# Patient Record
Sex: FEMALE | Race: WHITE | NOT HISPANIC OR LATINO | URBAN - METROPOLITAN AREA
[De-identification: names, ages, dates, MRNs, and addresses within clinical notes are randomized per-mention and may not be internally consistent; named-entity substitution may affect disease eponyms.]

---

## 2018-11-30 PROBLEM — Z00.00 ENCOUNTER FOR PREVENTIVE HEALTH EXAMINATION: Noted: 2018-11-30

## 2019-05-17 ENCOUNTER — INPATIENT (INPATIENT)
Facility: HOSPITAL | Age: 19
LOS: 1 days | Discharge: HOME | End: 2019-05-19
Attending: OBSTETRICS & GYNECOLOGY | Admitting: OBSTETRICS & GYNECOLOGY
Payer: MEDICAID

## 2019-05-17 VITALS — HEART RATE: 90 BPM | SYSTOLIC BLOOD PRESSURE: 122 MMHG | DIASTOLIC BLOOD PRESSURE: 73 MMHG

## 2019-05-17 LAB
ABO RH CONFIRMATION: SIGNIFICANT CHANGE UP
AMPHET UR-MCNC: NEGATIVE — SIGNIFICANT CHANGE UP
APPEARANCE UR: ABNORMAL
BACTERIA # UR AUTO: ABNORMAL /HPF
BARBITURATES UR SCN-MCNC: NEGATIVE — SIGNIFICANT CHANGE UP
BASOPHILS # BLD AUTO: 0.03 K/UL — SIGNIFICANT CHANGE UP (ref 0–0.2)
BASOPHILS NFR BLD AUTO: 0.2 % — SIGNIFICANT CHANGE UP (ref 0–1)
BENZODIAZ UR-MCNC: NEGATIVE — SIGNIFICANT CHANGE UP
BILIRUB UR-MCNC: NEGATIVE — SIGNIFICANT CHANGE UP
BLD GP AB SCN SERPL QL: SIGNIFICANT CHANGE UP
BUPRENORPHINE SCREEN, URINE RESULT: NEGATIVE — SIGNIFICANT CHANGE UP
COCAINE METAB.OTHER UR-MCNC: NEGATIVE — SIGNIFICANT CHANGE UP
COLOR SPEC: YELLOW — SIGNIFICANT CHANGE UP
DIFF PNL FLD: ABNORMAL
EOSINOPHIL # BLD AUTO: 0.02 K/UL — SIGNIFICANT CHANGE UP (ref 0–0.7)
EOSINOPHIL NFR BLD AUTO: 0.1 % — SIGNIFICANT CHANGE UP (ref 0–8)
EPI CELLS # UR: ABNORMAL /HPF
GLUCOSE UR QL: NEGATIVE MG/DL — SIGNIFICANT CHANGE UP
HCT VFR BLD CALC: 41.2 % — SIGNIFICANT CHANGE UP (ref 37–47)
HGB BLD-MCNC: 14.6 G/DL — SIGNIFICANT CHANGE UP (ref 12–16)
IMM GRANULOCYTES NFR BLD AUTO: 0.5 % — HIGH (ref 0.1–0.3)
KETONES UR-MCNC: NEGATIVE — SIGNIFICANT CHANGE UP
L&D DRUG SCREEN, URINE: SIGNIFICANT CHANGE UP
LEUKOCYTE ESTERASE UR-ACNC: ABNORMAL
LYMPHOCYTES # BLD AUTO: 1.12 K/UL — LOW (ref 1.2–3.4)
LYMPHOCYTES # BLD AUTO: 7.6 % — LOW (ref 20.5–51.1)
MCHC RBC-ENTMCNC: 30.2 PG — SIGNIFICANT CHANGE UP (ref 27–31)
MCHC RBC-ENTMCNC: 35.4 G/DL — SIGNIFICANT CHANGE UP (ref 32–37)
MCV RBC AUTO: 85.1 FL — SIGNIFICANT CHANGE UP (ref 81–99)
METHADONE UR-MCNC: NEGATIVE — SIGNIFICANT CHANGE UP
MONOCYTES # BLD AUTO: 0.61 K/UL — HIGH (ref 0.1–0.6)
MONOCYTES NFR BLD AUTO: 4.2 % — SIGNIFICANT CHANGE UP (ref 1.7–9.3)
NEUTROPHILS # BLD AUTO: 12.83 K/UL — HIGH (ref 1.4–6.5)
NEUTROPHILS NFR BLD AUTO: 87.4 % — HIGH (ref 42.2–75.2)
NITRITE UR-MCNC: NEGATIVE — SIGNIFICANT CHANGE UP
NRBC # BLD: 0 /100 WBCS — SIGNIFICANT CHANGE UP (ref 0–0)
OPIATES UR-MCNC: NEGATIVE — SIGNIFICANT CHANGE UP
OXYCODONE UR-MCNC: NEGATIVE — SIGNIFICANT CHANGE UP
PCP UR-MCNC: NEGATIVE — SIGNIFICANT CHANGE UP
PH UR: 7 — SIGNIFICANT CHANGE UP (ref 5–8)
PLATELET # BLD AUTO: 202 K/UL — SIGNIFICANT CHANGE UP (ref 130–400)
PRENATAL SYPHILIS TEST: SIGNIFICANT CHANGE UP
PROPOXYPHENE QUALITATIVE URINE RESULT: NEGATIVE — SIGNIFICANT CHANGE UP
PROT UR-MCNC: NEGATIVE MG/DL — SIGNIFICANT CHANGE UP
RBC # BLD: 4.84 M/UL — SIGNIFICANT CHANGE UP (ref 4.2–5.4)
RBC # FLD: 13.2 % — SIGNIFICANT CHANGE UP (ref 11.5–14.5)
RBC CASTS # UR COMP ASSIST: ABNORMAL /HPF
SP GR SPEC: 1.02 — SIGNIFICANT CHANGE UP (ref 1.01–1.03)
TYPE + AB SCN PNL BLD: SIGNIFICANT CHANGE UP
UROBILINOGEN FLD QL: 0.2 MG/DL — SIGNIFICANT CHANGE UP (ref 0.2–0.2)
WBC # BLD: 14.69 K/UL — HIGH (ref 4.8–10.8)
WBC # FLD AUTO: 14.69 K/UL — HIGH (ref 4.8–10.8)
WBC UR QL: SIGNIFICANT CHANGE UP /HPF

## 2019-05-17 PROCEDURE — 59400 OBSTETRICAL CARE: CPT | Mod: U9

## 2019-05-17 RX ORDER — NALOXONE HYDROCHLORIDE 4 MG/.1ML
0.1 SPRAY NASAL
Refills: 0 | Status: DISCONTINUED | OUTPATIENT
Start: 2019-05-17 | End: 2019-05-19

## 2019-05-17 RX ORDER — IBUPROFEN 200 MG
600 TABLET ORAL EVERY 6 HOURS
Refills: 0 | Status: DISCONTINUED | OUTPATIENT
Start: 2019-05-17 | End: 2019-05-19

## 2019-05-17 RX ORDER — DEXAMETHASONE 0.5 MG/5ML
4 ELIXIR ORAL EVERY 6 HOURS
Refills: 0 | Status: DISCONTINUED | OUTPATIENT
Start: 2019-05-17 | End: 2019-05-19

## 2019-05-17 RX ORDER — SODIUM CHLORIDE 9 MG/ML
1000 INJECTION, SOLUTION INTRAVENOUS
Refills: 0 | Status: DISCONTINUED | OUTPATIENT
Start: 2019-05-17 | End: 2019-05-17

## 2019-05-17 RX ORDER — BENZOCAINE 10 %
1 GEL (GRAM) MUCOUS MEMBRANE EVERY 6 HOURS
Refills: 0 | Status: DISCONTINUED | OUTPATIENT
Start: 2019-05-17 | End: 2019-05-19

## 2019-05-17 RX ORDER — DIPHENHYDRAMINE HCL 50 MG
25 CAPSULE ORAL EVERY 6 HOURS
Refills: 0 | Status: DISCONTINUED | OUTPATIENT
Start: 2019-05-17 | End: 2019-05-19

## 2019-05-17 RX ORDER — IBUPROFEN 200 MG
600 TABLET ORAL EVERY 6 HOURS
Refills: 0 | Status: COMPLETED | OUTPATIENT
Start: 2019-05-17 | End: 2020-04-14

## 2019-05-17 RX ORDER — OXYTOCIN 10 UNIT/ML
41.67 VIAL (ML) INJECTION
Qty: 20 | Refills: 0 | Status: DISCONTINUED | OUTPATIENT
Start: 2019-05-17 | End: 2019-05-19

## 2019-05-17 RX ORDER — SODIUM CHLORIDE 9 MG/ML
3 INJECTION INTRAMUSCULAR; INTRAVENOUS; SUBCUTANEOUS EVERY 8 HOURS
Refills: 0 | Status: DISCONTINUED | OUTPATIENT
Start: 2019-05-17 | End: 2019-05-18

## 2019-05-17 RX ORDER — OXYCODONE HYDROCHLORIDE 5 MG/1
5 TABLET ORAL ONCE
Refills: 0 | Status: DISCONTINUED | OUTPATIENT
Start: 2019-05-17 | End: 2019-05-19

## 2019-05-17 RX ORDER — OXYTOCIN 10 UNIT/ML
2 VIAL (ML) INJECTION
Qty: 30 | Refills: 0 | Status: DISCONTINUED | OUTPATIENT
Start: 2019-05-17 | End: 2019-05-17

## 2019-05-17 RX ORDER — ONDANSETRON 8 MG/1
4 TABLET, FILM COATED ORAL EVERY 6 HOURS
Refills: 0 | Status: DISCONTINUED | OUTPATIENT
Start: 2019-05-17 | End: 2019-05-19

## 2019-05-17 RX ORDER — OXYTOCIN 10 UNIT/ML
333.33 VIAL (ML) INJECTION
Qty: 20 | Refills: 0 | Status: DISCONTINUED | OUTPATIENT
Start: 2019-05-17 | End: 2019-05-19

## 2019-05-17 RX ORDER — OXYCODONE HYDROCHLORIDE 5 MG/1
5 TABLET ORAL
Refills: 0 | Status: DISCONTINUED | OUTPATIENT
Start: 2019-05-17 | End: 2019-05-19

## 2019-05-17 RX ORDER — GLYCERIN ADULT
1 SUPPOSITORY, RECTAL RECTAL AT BEDTIME
Refills: 0 | Status: DISCONTINUED | OUTPATIENT
Start: 2019-05-17 | End: 2019-05-19

## 2019-05-17 RX ORDER — DIBUCAINE 1 %
1 OINTMENT (GRAM) RECTAL EVERY 6 HOURS
Refills: 0 | Status: DISCONTINUED | OUTPATIENT
Start: 2019-05-17 | End: 2019-05-19

## 2019-05-17 RX ORDER — DOCUSATE SODIUM 100 MG
100 CAPSULE ORAL
Refills: 0 | Status: DISCONTINUED | OUTPATIENT
Start: 2019-05-17 | End: 2019-05-19

## 2019-05-17 RX ORDER — SIMETHICONE 80 MG/1
80 TABLET, CHEWABLE ORAL EVERY 4 HOURS
Refills: 0 | Status: DISCONTINUED | OUTPATIENT
Start: 2019-05-17 | End: 2019-05-19

## 2019-05-17 RX ORDER — KETOROLAC TROMETHAMINE 30 MG/ML
30 SYRINGE (ML) INJECTION ONCE
Refills: 0 | Status: DISCONTINUED | OUTPATIENT
Start: 2019-05-17 | End: 2019-05-17

## 2019-05-17 RX ORDER — HYDROCORTISONE 1 %
1 OINTMENT (GRAM) TOPICAL EVERY 6 HOURS
Refills: 0 | Status: DISCONTINUED | OUTPATIENT
Start: 2019-05-17 | End: 2019-05-19

## 2019-05-17 RX ORDER — AER TRAVELER 0.5 G/1
1 SOLUTION RECTAL; TOPICAL EVERY 4 HOURS
Refills: 0 | Status: DISCONTINUED | OUTPATIENT
Start: 2019-05-17 | End: 2019-05-19

## 2019-05-17 RX ORDER — LANOLIN
1 OINTMENT (GRAM) TOPICAL EVERY 6 HOURS
Refills: 0 | Status: DISCONTINUED | OUTPATIENT
Start: 2019-05-17 | End: 2019-05-19

## 2019-05-17 RX ORDER — ACETAMINOPHEN 500 MG
975 TABLET ORAL EVERY 6 HOURS
Refills: 0 | Status: DISCONTINUED | OUTPATIENT
Start: 2019-05-17 | End: 2019-05-19

## 2019-05-17 RX ORDER — BUPIVACAINE HCL/PF 7.5 MG/ML
250 VIAL (ML) INJECTION
Refills: 0 | Status: DISCONTINUED | OUTPATIENT
Start: 2019-05-17 | End: 2019-05-19

## 2019-05-17 RX ORDER — MAGNESIUM HYDROXIDE 400 MG/1
30 TABLET, CHEWABLE ORAL
Refills: 0 | Status: DISCONTINUED | OUTPATIENT
Start: 2019-05-17 | End: 2019-05-19

## 2019-05-17 RX ADMIN — Medication 975 MILLIGRAM(S): at 21:31

## 2019-05-17 RX ADMIN — SODIUM CHLORIDE 3 MILLILITER(S): 9 INJECTION INTRAMUSCULAR; INTRAVENOUS; SUBCUTANEOUS at 21:12

## 2019-05-17 RX ADMIN — Medication 30 MILLIGRAM(S): at 15:19

## 2019-05-17 RX ADMIN — Medication 600 MILLIGRAM(S): at 17:49

## 2019-05-17 RX ADMIN — Medication 2 MILLIUNIT(S)/MIN: at 10:57

## 2019-05-17 NOTE — PROGRESS NOTE ADULT - SUBJECTIVE AND OBJECTIVE BOX
PGY1 Note    Patient seen at bedside for evaluation for a prolonged deceleration lasting 8 min with benedict of 60pbm, recovered to baseline with resuscitative measures including pitocin off, IV fluid bolus, repositioning and oxygen mask. Patient has no complaints    T(F): 98.24 ( @ 10:27), Max: 98.7 ( @ 06:29)  HR: 71 ( @ 11:09)  BP: 97/53 ( @ 11:09) (93/50 - 130/75)  RR: 18 ( @ 06:29)  EFM: 150/mod/+accels  TOCO: q 1-5min  SVE: 7/90/-1    Medications:    oxytocin Infusion: 2 ( @ 10:43) Pitocin off      Labs:                        14.6   14.69 )-----------( 202      ( 17 May 2019 07:31 )             41.2           Prenatal Syphilis Test: Nonreact ( @ 07:31)  Type + Screen: B POS (19 @ 07:31)  Antibody Screen: NEG (19 @ 07:31)    Urinalysis Basic - ( 17 May 2019 07:31 )    Color: Yellow / Appearance: Cloudy / S.025 / pH: x  Gluc: x / Ketone: Negative  / Bili: Negative / Urobili: 0.2 mg/dL   Blood: x / Protein: Negative mg/dL / Nitrite: Negative   Leuk Esterase: Trace / RBC: 26-50 /HPF / WBC 3-5 /HPF   Sq Epi: x / Non Sq Epi: Moderate /HPF / Bacteria: Moderate /HPF    UDS pending

## 2019-05-17 NOTE — PROCEDURE NOTE - NSCATHREMOVE_OBGYN_ALL_OB
I reviewed the H&P, I examined the patient, and there are no changes in the patient's condition.     No, to be performed by RN per instructions

## 2019-05-17 NOTE — PROGRESS NOTE ADULT - ASSESSMENT
18yo  at 40.6w in labor, GBS negative, with 8min deceleration down to 60bpm, recovered to baseline, pitocin off    -Cont efm and toco  -IV hydration and clear liquid diet  -Pain management prn  -Restart pitocin once recovered    Dr. Harding and Dr. Heard aware

## 2019-05-17 NOTE — OB RN PATIENT PROFILE - NO SIGNIFICANT PAST SURGICAL HISTORY
We are STOPPING the amlodipine (Norvasc) because you no longer need it for your blood pressure. My hope is that your energy will improve a little when your blood pressure comes up a little bit. Please schedule a visit with Dr. Nehal Jackson for your heart. Use your Spiriva and Advair EVERY DAY to PREVENT breathing symptoms. When you have symptoms, use the albuterol for relief. <<----- Click to add NO significant Past Surgical History

## 2019-05-17 NOTE — OB PROVIDER H&P - ATTENDING COMMENTS
18 y/o p0 at 40.6 wks w/c/o irreg ctx, +fm, no rom, no bleeding.    pnc: uncomplicated  gbs neg  abd: f=4824 g, nt  ext: no edema  cx: 5/90/-1/ruptured clear on exam  nst: reactive  toco: irreg ctx  admit, analgesia, pitocin prn, r/b/a rev, ques answered, anticipate nsd

## 2019-05-17 NOTE — OB PROVIDER H&P - NSHPPHYSICALEXAM_GEN_ALL_CORE
PHYSICAL EXAM:  T(F): 98.7 (05-17 @ 06:29)  HR: 90 (05-17 @ 06:29)  BP: 122/73 (05-17 @ 06:29)  RR: 18 (05-17 @ 06:29)  Constitutional: AAOx3, NAD  Abdomen: Soft, gravid, nontender, no palpable ctx    TOCO Q 4  VE 5/90/-1  no membranes palpable PHYSICAL EXAM:  T(F): 98.7 (05-17 @ 06:29)  HR: 90 (05-17 @ 06:29)  BP: 122/73 (05-17 @ 06:29)  RR: 18 (05-17 @ 06:29)  Constitutional: AAOx3, NAD  Abdomen: Soft, gravid, nontender, no palpable ctx   CAT I  TOCO Q 4  VE 5/90/-1  no membranes palpable

## 2019-05-17 NOTE — OB PROVIDER H&P - ASSESSMENT
19y @  40.6 weeks in active labor  Admit to Labor & delivery  IV hydration  admission labs  Clear fluids  EFM & TOCO monitoring  analgesia prn  Dr Heard aware

## 2019-05-17 NOTE — OB PROVIDER H&P - HISTORY OF PRESENT ILLNESS
The pt is a 19y y/o   @ 40.6  weeks  edc  19 dated by lmp who presents to labor & delivery c/o  contractions, bloody denisse, with some leaking not sure what time  + fetal movement

## 2019-05-17 NOTE — OB PROVIDER DELIVERY SUMMARY - NSPROVIDERDELIVERYNOTE_OBGYN_ALL_OB_FT
Patient fully dilated, OA, pushed to deliver viable male .  Apgar 9/9.  Placenta intact with 3vc. Cervix, vagina intact.  Median episiotomy repaired with 2-0/3-0 chromic.   cc.  Tolerated well.  Infant to wbn.

## 2019-05-18 LAB
BASOPHILS # BLD AUTO: 0.03 K/UL — SIGNIFICANT CHANGE UP (ref 0–0.2)
BASOPHILS NFR BLD AUTO: 0.2 % — SIGNIFICANT CHANGE UP (ref 0–1)
EOSINOPHIL # BLD AUTO: 0.07 K/UL — SIGNIFICANT CHANGE UP (ref 0–0.7)
EOSINOPHIL NFR BLD AUTO: 0.5 % — SIGNIFICANT CHANGE UP (ref 0–8)
HCT VFR BLD CALC: 35.1 % — LOW (ref 37–47)
HGB BLD-MCNC: 12 G/DL — SIGNIFICANT CHANGE UP (ref 12–16)
IMM GRANULOCYTES NFR BLD AUTO: 0.5 % — HIGH (ref 0.1–0.3)
LYMPHOCYTES # BLD AUTO: 1.89 K/UL — SIGNIFICANT CHANGE UP (ref 1.2–3.4)
LYMPHOCYTES # BLD AUTO: 14.8 % — LOW (ref 20.5–51.1)
MCHC RBC-ENTMCNC: 30.1 PG — SIGNIFICANT CHANGE UP (ref 27–31)
MCHC RBC-ENTMCNC: 34.2 G/DL — SIGNIFICANT CHANGE UP (ref 32–37)
MCV RBC AUTO: 88 FL — SIGNIFICANT CHANGE UP (ref 81–99)
MONOCYTES # BLD AUTO: 1.01 K/UL — HIGH (ref 0.1–0.6)
MONOCYTES NFR BLD AUTO: 7.9 % — SIGNIFICANT CHANGE UP (ref 1.7–9.3)
NEUTROPHILS # BLD AUTO: 9.69 K/UL — HIGH (ref 1.4–6.5)
NEUTROPHILS NFR BLD AUTO: 76.1 % — HIGH (ref 42.2–75.2)
NRBC # BLD: 0 /100 WBCS — SIGNIFICANT CHANGE UP (ref 0–0)
PLATELET # BLD AUTO: 144 K/UL — SIGNIFICANT CHANGE UP (ref 130–400)
RBC # BLD: 3.99 M/UL — LOW (ref 4.2–5.4)
RBC # FLD: 13.7 % — SIGNIFICANT CHANGE UP (ref 11.5–14.5)
WBC # BLD: 12.75 K/UL — HIGH (ref 4.8–10.8)
WBC # FLD AUTO: 12.75 K/UL — HIGH (ref 4.8–10.8)

## 2019-05-18 RX ADMIN — SODIUM CHLORIDE 3 MILLILITER(S): 9 INJECTION INTRAMUSCULAR; INTRAVENOUS; SUBCUTANEOUS at 15:39

## 2019-05-18 RX ADMIN — Medication 975 MILLIGRAM(S): at 09:23

## 2019-05-18 RX ADMIN — Medication 600 MILLIGRAM(S): at 17:46

## 2019-05-18 RX ADMIN — Medication 600 MILLIGRAM(S): at 12:07

## 2019-05-18 RX ADMIN — Medication 600 MILLIGRAM(S): at 00:34

## 2019-05-18 RX ADMIN — Medication 1 TABLET(S): at 12:07

## 2019-05-18 RX ADMIN — SODIUM CHLORIDE 3 MILLILITER(S): 9 INJECTION INTRAMUSCULAR; INTRAVENOUS; SUBCUTANEOUS at 05:50

## 2019-05-18 RX ADMIN — Medication 975 MILLIGRAM(S): at 04:20

## 2019-05-18 RX ADMIN — Medication 100 MILLIGRAM(S): at 23:34

## 2019-05-18 RX ADMIN — Medication 600 MILLIGRAM(S): at 23:34

## 2019-05-18 RX ADMIN — Medication 600 MILLIGRAM(S): at 05:48

## 2019-05-18 NOTE — PROGRESS NOTE ADULT - SUBJECTIVE AND OBJECTIVE BOX
OB attending  PPD #1    Pt doing well, pain well controlled. No overnight events, no acute complaints.    Ambulating: Yes  Voiding: Yes  Flatus: Yes  Bowel movements: Yes   Breast or bottle feeding: Breastfeeding  Diet: Regular    PAST MEDICAL & SURGICAL HISTORY:  No pertinent past medical history  No significant past surgical history      Physical Exam  Vital Signs Last 24 Hrs  T(C): 36.4 (17 May 2019 23:44), Max: 36.8 (17 May 2019 10:27)  T(F): 97.6 (17 May 2019 23:44), Max: 98.24 (17 May 2019 10:27)  HR: 80 (17 May 2019 23:44) (70 - 103)  BP: 118/56 (17 May 2019 23:44) (93/50 - 130/75)  BP(mean): --  RR: 18 (17 May 2019 23:44) (18 - 18)  SpO2: --  Gen: AAOx3, NAD  Abd: Soft, nontender, nondistended, BS+  Fundus: Firm, below umbilicus  Lochia: normal  Ext: No calf tenderness, no swelling    Labs:                        12.0   12.75 )-----------( 144      ( 18 May 2019 04:30 )             35.1         A/P:20 yo , s/p , PPD #1, doing well  - continue current management

## 2019-05-19 ENCOUNTER — TRANSCRIPTION ENCOUNTER (OUTPATIENT)
Age: 19
End: 2019-05-19

## 2019-05-19 VITALS
DIASTOLIC BLOOD PRESSURE: 57 MMHG | HEART RATE: 71 BPM | RESPIRATION RATE: 18 BRPM | SYSTOLIC BLOOD PRESSURE: 110 MMHG | TEMPERATURE: 96 F

## 2019-05-19 RX ORDER — ACETAMINOPHEN 500 MG
3 TABLET ORAL
Qty: 0 | Refills: 0 | DISCHARGE
Start: 2019-05-19

## 2019-05-19 RX ADMIN — Medication 600 MILLIGRAM(S): at 06:16

## 2019-05-19 RX ADMIN — Medication 600 MILLIGRAM(S): at 11:32

## 2019-05-19 NOTE — DISCHARGE NOTE OB - CARE PLAN
no flank pain L/no dysuria/normal urinary frequency/no hematuria/no renal colic/no flank pain R/no incontinence/no urinary hesitancy
Principal Discharge DX:	Vaginal delivery  Goal:	healthy recovery  Assessment and plan of treatment:	Nothing per vagina x 6 weeks; no sex/tampons/swimming/baths. Showers are OKAY.

## 2019-05-19 NOTE — DISCHARGE NOTE OB - HOSPITAL COURSE
DATE OF DISCHARGE: 19 @ 08:27    HISTORY OF PRESENT ILLNESS/HOSPITAL COURSE: HPI:  The pt is a 19y y/o   @ 40.6  weeks  edc  19 dated by lmp who presents to labor & delivery c/o  contractions, bloody denisse, with some leaking not sure what time  + fetal movement (17 May 2019 07:31)    PAST MEDICAL & SURGICAL HISTORY:  No pertinent past medical history  No significant past surgical history      PROCEDURES PERFORMED: vaginal delivery    COMPLICATIONS:  none    POST PARTUM COURSE: uncomplicated     FINAL DIAGNOSIS:  normal vaginal delivery    LABS:                       12.0   12.75 )-----------( 144      ( 18 May 2019 04:30 )             35.1

## 2019-05-19 NOTE — DISCHARGE NOTE OB - MEDICATION SUMMARY - MEDICATIONS TO STOP TAKING
I will STOP taking the medications listed below when I get home from the hospital:    acetaminophen 325 mg oral tablet  -- 3 tab(s) by mouth every 6 hours

## 2019-05-19 NOTE — DISCHARGE NOTE OB - CARE PROVIDER_API CALL
Claus Capone)  Obstetrics and Gynecology  [address] [place] [address]  Phone: [phone]  Fax: [phone]  Follow Up Time:

## 2019-05-19 NOTE — DISCHARGE NOTE OB - PATIENT PORTAL LINK FT
You can access the [facility] Patient Portal, offered by [facility], by registering with the following website: [email or web address]

## 2019-05-21 ENCOUNTER — INPATIENT (INPATIENT)
Facility: HOSPITAL | Age: 19
LOS: 1 days | Discharge: HOME | End: 2019-05-23
Attending: OBSTETRICS & GYNECOLOGY | Admitting: OBSTETRICS & GYNECOLOGY
Payer: MEDICAID

## 2019-05-21 VITALS — TEMPERATURE: 98 F

## 2019-05-21 LAB
ALBUMIN SERPL ELPH-MCNC: 3.6 G/DL — SIGNIFICANT CHANGE UP (ref 3.5–5.2)
ALP SERPL-CCNC: 141 U/L — HIGH (ref 30–115)
ALT FLD-CCNC: 93 U/L — HIGH (ref 0–41)
ANION GAP SERPL CALC-SCNC: 13 MMOL/L — SIGNIFICANT CHANGE UP (ref 7–14)
APPEARANCE UR: CLEAR — SIGNIFICANT CHANGE UP
AST SERPL-CCNC: 51 U/L — HIGH (ref 0–41)
BACTERIA # UR AUTO: ABNORMAL /HPF
BILIRUB SERPL-MCNC: <0.2 MG/DL — SIGNIFICANT CHANGE UP (ref 0.2–1.2)
BILIRUB UR-MCNC: NEGATIVE — SIGNIFICANT CHANGE UP
BUN SERPL-MCNC: 12 MG/DL — SIGNIFICANT CHANGE UP (ref 10–20)
CALCIUM SERPL-MCNC: 8.7 MG/DL — SIGNIFICANT CHANGE UP (ref 8.5–10.1)
CHLORIDE SERPL-SCNC: 109 MMOL/L — SIGNIFICANT CHANGE UP (ref 98–110)
CO2 SERPL-SCNC: 21 MMOL/L — SIGNIFICANT CHANGE UP (ref 17–32)
COLOR SPEC: YELLOW — SIGNIFICANT CHANGE UP
CREAT SERPL-MCNC: 0.5 MG/DL — LOW (ref 0.7–1.5)
DIFF PNL FLD: ABNORMAL
EPI CELLS # UR: ABNORMAL /HPF
GLUCOSE SERPL-MCNC: 82 MG/DL — SIGNIFICANT CHANGE UP (ref 70–99)
GLUCOSE UR QL: NEGATIVE MG/DL — SIGNIFICANT CHANGE UP
HCT VFR BLD CALC: 41.3 % — SIGNIFICANT CHANGE UP (ref 37–47)
HGB BLD-MCNC: 14.1 G/DL — SIGNIFICANT CHANGE UP (ref 12–16)
KETONES UR-MCNC: NEGATIVE — SIGNIFICANT CHANGE UP
LDH SERPL L TO P-CCNC: 341 — HIGH (ref 50–242)
LEUKOCYTE ESTERASE UR-ACNC: ABNORMAL
MCHC RBC-ENTMCNC: 29.8 PG — SIGNIFICANT CHANGE UP (ref 27–31)
MCHC RBC-ENTMCNC: 34.1 G/DL — SIGNIFICANT CHANGE UP (ref 32–37)
MCV RBC AUTO: 87.3 FL — SIGNIFICANT CHANGE UP (ref 81–99)
NITRITE UR-MCNC: NEGATIVE — SIGNIFICANT CHANGE UP
NRBC # BLD: 0 /100 WBCS — SIGNIFICANT CHANGE UP (ref 0–0)
PH UR: 6.5 — SIGNIFICANT CHANGE UP (ref 5–8)
PLATELET # BLD AUTO: 218 K/UL — SIGNIFICANT CHANGE UP (ref 130–400)
POTASSIUM SERPL-MCNC: 4.1 MMOL/L — SIGNIFICANT CHANGE UP (ref 3.5–5)
POTASSIUM SERPL-SCNC: 4.1 MMOL/L — SIGNIFICANT CHANGE UP (ref 3.5–5)
PROT SERPL-MCNC: 6 G/DL — SIGNIFICANT CHANGE UP (ref 6–8)
PROT UR-MCNC: NEGATIVE MG/DL — SIGNIFICANT CHANGE UP
RBC # BLD: 4.73 M/UL — SIGNIFICANT CHANGE UP (ref 4.2–5.4)
RBC # FLD: 13.3 % — SIGNIFICANT CHANGE UP (ref 11.5–14.5)
RBC CASTS # UR COMP ASSIST: ABNORMAL /HPF
SODIUM SERPL-SCNC: 143 MMOL/L — SIGNIFICANT CHANGE UP (ref 135–146)
SP GR SPEC: 1.01 — SIGNIFICANT CHANGE UP (ref 1.01–1.03)
URATE SERPL-MCNC: 4.5 MG/DL — SIGNIFICANT CHANGE UP (ref 2.5–7)
UROBILINOGEN FLD QL: 0.2 MG/DL — SIGNIFICANT CHANGE UP (ref 0.2–0.2)
WBC # BLD: 8.53 K/UL — SIGNIFICANT CHANGE UP (ref 4.8–10.8)
WBC # FLD AUTO: 8.53 K/UL — SIGNIFICANT CHANGE UP (ref 4.8–10.8)
WBC UR QL: ABNORMAL /HPF

## 2019-05-21 RX ORDER — NIFEDIPINE 30 MG
30 TABLET, EXTENDED RELEASE 24 HR ORAL ONCE
Refills: 0 | Status: COMPLETED | OUTPATIENT
Start: 2019-05-21 | End: 2019-05-21

## 2019-05-21 RX ADMIN — Medication 30 MILLIGRAM(S): at 23:13

## 2019-05-21 NOTE — OB PROVIDER TRIAGE NOTE - NSHPPHYSICALEXAM_GEN_ALL_CORE
Vital Signs Last 24 Hrs  T(C): 36.7 (21 May 2019 22:12), Max: 36.7 (21 May 2019 22:12)  T(F): 98 (21 May 2019 22:12), Max: 98 (21 May 2019 22:12)  HR: 69 (21 May 2019 22:34) (65 - 75)  BP: 146/81 (21 May 2019 22:34) (145/78 - 160/84)  BP(mean): --  RR: 20 (21 May 2019 22:12) (20 - 20)  SpO2: --    GEN: NAD, AAOX3  CVS: RRR, normal S1/S2  lungs; CTAB  abd: soft, nontender, no epigastric tenderness, firm fundus below umbilicus  ext: no calf tenderness, +2 pitting edema  SVE: deferred

## 2019-05-21 NOTE — OB PROVIDER TRIAGE NOTE - HISTORY OF PRESENT ILLNESS
18y/o , s/p uncomplicated  on , sent by PMD office for elevated BPs in the office. Patient reports that today her legs felt swollen so she came to the office to be evaluated. Blood pressure in the office was 150/90. Denies headache, blurry vision, chest pain, SOB, epigastric pain. Reports leg swelling during the pregnancy but this morning became much worse. Denies elevated blood pressures in the pregnancy. Pregnancy and postpartum course uncomplicated.

## 2019-05-21 NOTE — OB PROVIDER TRIAGE NOTE - NS_OBGYNHISTORY_OBGYN_ALL_OB_FT
OB Hx: NSVDX1, full term, no complications, 6-12  Gyn Hx: Denies cysts, fibroids, abnormal PAP smears and STDs.

## 2019-05-21 NOTE — OB PROVIDER TRIAGE NOTE - NSOBPROVIDERNOTE_OBGYN_ALL_OB_FT
18 y/o P1, s/p , PPD4, with elevated BPs, r/o gTHN vs preeclampsia, for prolonged BP monitoring.   - transfer to recovery room  - f/u BPs q15 min.   - preeclamptic labs  - regular diet  - bedrest with bathroom privileges    Dr. Ryan and Dr. Araujo aware

## 2019-05-22 DIAGNOSIS — Z3A.40 40 WEEKS GESTATION OF PREGNANCY: ICD-10-CM

## 2019-05-22 DIAGNOSIS — Z34.80 ENCOUNTER FOR SUPERVISION OF OTHER NORMAL PREGNANCY, UNSPECIFIED TRIMESTER: ICD-10-CM

## 2019-05-22 LAB
ALBUMIN SERPL ELPH-MCNC: 3.6 G/DL — SIGNIFICANT CHANGE UP (ref 3.5–5.2)
ALBUMIN SERPL ELPH-MCNC: 3.6 G/DL — SIGNIFICANT CHANGE UP (ref 3.5–5.2)
ALP SERPL-CCNC: 138 U/L — HIGH (ref 30–115)
ALP SERPL-CCNC: 140 U/L — HIGH (ref 30–115)
ALT FLD-CCNC: 87 U/L — HIGH (ref 0–41)
ALT FLD-CCNC: 90 U/L — HIGH (ref 0–41)
ANION GAP SERPL CALC-SCNC: 13 MMOL/L — SIGNIFICANT CHANGE UP (ref 7–14)
ANION GAP SERPL CALC-SCNC: 14 MMOL/L — SIGNIFICANT CHANGE UP (ref 7–14)
AST SERPL-CCNC: 41 U/L — SIGNIFICANT CHANGE UP (ref 0–41)
AST SERPL-CCNC: 44 U/L — HIGH (ref 0–41)
BASOPHILS # BLD AUTO: 0.03 K/UL — SIGNIFICANT CHANGE UP (ref 0–0.2)
BASOPHILS # BLD AUTO: 0.04 K/UL — SIGNIFICANT CHANGE UP (ref 0–0.2)
BASOPHILS NFR BLD AUTO: 0.4 % — SIGNIFICANT CHANGE UP (ref 0–1)
BASOPHILS NFR BLD AUTO: 0.4 % — SIGNIFICANT CHANGE UP (ref 0–1)
BILIRUB SERPL-MCNC: 0.2 MG/DL — SIGNIFICANT CHANGE UP (ref 0.2–1.2)
BILIRUB SERPL-MCNC: 0.3 MG/DL — SIGNIFICANT CHANGE UP (ref 0.2–1.2)
BUN SERPL-MCNC: 6 MG/DL — LOW (ref 10–20)
BUN SERPL-MCNC: 8 MG/DL — LOW (ref 10–20)
CALCIUM SERPL-MCNC: 7.4 MG/DL — LOW (ref 8.5–10.1)
CALCIUM SERPL-MCNC: 8.1 MG/DL — LOW (ref 8.5–10.1)
CHLORIDE SERPL-SCNC: 104 MMOL/L — SIGNIFICANT CHANGE UP (ref 98–110)
CHLORIDE SERPL-SCNC: 107 MMOL/L — SIGNIFICANT CHANGE UP (ref 98–110)
CO2 SERPL-SCNC: 24 MMOL/L — SIGNIFICANT CHANGE UP (ref 17–32)
CO2 SERPL-SCNC: 24 MMOL/L — SIGNIFICANT CHANGE UP (ref 17–32)
CREAT ?TM UR-MCNC: 46 MG/DL — SIGNIFICANT CHANGE UP
CREAT SERPL-MCNC: <0.5 MG/DL — LOW (ref 0.7–1.5)
CREAT SERPL-MCNC: <0.5 MG/DL — LOW (ref 0.7–1.5)
EOSINOPHIL # BLD AUTO: 0.18 K/UL — SIGNIFICANT CHANGE UP (ref 0–0.7)
EOSINOPHIL # BLD AUTO: 0.21 K/UL — SIGNIFICANT CHANGE UP (ref 0–0.7)
EOSINOPHIL NFR BLD AUTO: 2.2 % — SIGNIFICANT CHANGE UP (ref 0–8)
EOSINOPHIL NFR BLD AUTO: 2.4 % — SIGNIFICANT CHANGE UP (ref 0–8)
GLUCOSE SERPL-MCNC: 103 MG/DL — HIGH (ref 70–99)
GLUCOSE SERPL-MCNC: 89 MG/DL — SIGNIFICANT CHANGE UP (ref 70–99)
HCT VFR BLD CALC: 39.4 % — SIGNIFICANT CHANGE UP (ref 37–47)
HCT VFR BLD CALC: 39.4 % — SIGNIFICANT CHANGE UP (ref 37–47)
HGB BLD-MCNC: 13.6 G/DL — SIGNIFICANT CHANGE UP (ref 12–16)
HGB BLD-MCNC: 13.8 G/DL — SIGNIFICANT CHANGE UP (ref 12–16)
IMM GRANULOCYTES NFR BLD AUTO: 0.2 % — SIGNIFICANT CHANGE UP (ref 0.1–0.3)
IMM GRANULOCYTES NFR BLD AUTO: 0.4 % — HIGH (ref 0.1–0.3)
LDH SERPL L TO P-CCNC: 282 — HIGH (ref 50–242)
LDH SERPL L TO P-CCNC: 317 — HIGH (ref 50–242)
LYMPHOCYTES # BLD AUTO: 1.35 K/UL — SIGNIFICANT CHANGE UP (ref 1.2–3.4)
LYMPHOCYTES # BLD AUTO: 1.81 K/UL — SIGNIFICANT CHANGE UP (ref 1.2–3.4)
LYMPHOCYTES # BLD AUTO: 16.1 % — LOW (ref 20.5–51.1)
LYMPHOCYTES # BLD AUTO: 20.3 % — LOW (ref 20.5–51.1)
MAGNESIUM SERPL-MCNC: 5.2 MG/DL — CRITICAL HIGH (ref 1.8–2.4)
MAGNESIUM SERPL-MCNC: 6.3 MG/DL — CRITICAL HIGH (ref 1.8–2.4)
MCHC RBC-ENTMCNC: 30 PG — SIGNIFICANT CHANGE UP (ref 27–31)
MCHC RBC-ENTMCNC: 30.2 PG — SIGNIFICANT CHANGE UP (ref 27–31)
MCHC RBC-ENTMCNC: 34.5 G/DL — SIGNIFICANT CHANGE UP (ref 32–37)
MCHC RBC-ENTMCNC: 35 G/DL — SIGNIFICANT CHANGE UP (ref 32–37)
MCV RBC AUTO: 86.2 FL — SIGNIFICANT CHANGE UP (ref 81–99)
MCV RBC AUTO: 87 FL — SIGNIFICANT CHANGE UP (ref 81–99)
MONOCYTES # BLD AUTO: 0.67 K/UL — HIGH (ref 0.1–0.6)
MONOCYTES # BLD AUTO: 0.68 K/UL — HIGH (ref 0.1–0.6)
MONOCYTES NFR BLD AUTO: 7.6 % — SIGNIFICANT CHANGE UP (ref 1.7–9.3)
MONOCYTES NFR BLD AUTO: 8 % — SIGNIFICANT CHANGE UP (ref 1.7–9.3)
NEUTROPHILS # BLD AUTO: 6.11 K/UL — SIGNIFICANT CHANGE UP (ref 1.4–6.5)
NEUTROPHILS # BLD AUTO: 6.14 K/UL — SIGNIFICANT CHANGE UP (ref 1.4–6.5)
NEUTROPHILS NFR BLD AUTO: 68.9 % — SIGNIFICANT CHANGE UP (ref 42.2–75.2)
NEUTROPHILS NFR BLD AUTO: 73.1 % — SIGNIFICANT CHANGE UP (ref 42.2–75.2)
NRBC # BLD: 0 /100 WBCS — SIGNIFICANT CHANGE UP (ref 0–0)
NRBC # BLD: 0 /100 WBCS — SIGNIFICANT CHANGE UP (ref 0–0)
PLATELET # BLD AUTO: 210 K/UL — SIGNIFICANT CHANGE UP (ref 130–400)
PLATELET # BLD AUTO: 240 K/UL — SIGNIFICANT CHANGE UP (ref 130–400)
POTASSIUM SERPL-MCNC: 3.6 MMOL/L — SIGNIFICANT CHANGE UP (ref 3.5–5)
POTASSIUM SERPL-MCNC: 3.8 MMOL/L — SIGNIFICANT CHANGE UP (ref 3.5–5)
POTASSIUM SERPL-SCNC: 3.6 MMOL/L — SIGNIFICANT CHANGE UP (ref 3.5–5)
POTASSIUM SERPL-SCNC: 3.8 MMOL/L — SIGNIFICANT CHANGE UP (ref 3.5–5)
PROT ?TM UR-MCNC: 16 MG/DLG/24H — SIGNIFICANT CHANGE UP
PROT SERPL-MCNC: 5.9 G/DL — LOW (ref 6–8)
PROT SERPL-MCNC: 6 G/DL — SIGNIFICANT CHANGE UP (ref 6–8)
PROT/CREAT UR-RTO: 0.3 RATIO — HIGH (ref 0–0.2)
RBC # BLD: 4.53 M/UL — SIGNIFICANT CHANGE UP (ref 4.2–5.4)
RBC # BLD: 4.57 M/UL — SIGNIFICANT CHANGE UP (ref 4.2–5.4)
RBC # FLD: 13.4 % — SIGNIFICANT CHANGE UP (ref 11.5–14.5)
RBC # FLD: 13.4 % — SIGNIFICANT CHANGE UP (ref 11.5–14.5)
SODIUM SERPL-SCNC: 142 MMOL/L — SIGNIFICANT CHANGE UP (ref 135–146)
SODIUM SERPL-SCNC: 144 MMOL/L — SIGNIFICANT CHANGE UP (ref 135–146)
URATE SERPL-MCNC: 4.8 MG/DL — SIGNIFICANT CHANGE UP (ref 2.5–7)
URATE SERPL-MCNC: 5.5 MG/DL — SIGNIFICANT CHANGE UP (ref 2.5–7)
WBC # BLD: 8.36 K/UL — SIGNIFICANT CHANGE UP (ref 4.8–10.8)
WBC # BLD: 8.92 K/UL — SIGNIFICANT CHANGE UP (ref 4.8–10.8)
WBC # FLD AUTO: 8.36 K/UL — SIGNIFICANT CHANGE UP (ref 4.8–10.8)
WBC # FLD AUTO: 8.92 K/UL — SIGNIFICANT CHANGE UP (ref 4.8–10.8)

## 2019-05-22 PROCEDURE — 99221 1ST HOSP IP/OBS SF/LOW 40: CPT | Mod: 24

## 2019-05-22 RX ORDER — MAGNESIUM SULFATE 500 MG/ML
2 VIAL (ML) INJECTION
Qty: 40 | Refills: 0 | Status: DISCONTINUED | OUTPATIENT
Start: 2019-05-22 | End: 2019-05-23

## 2019-05-22 RX ORDER — MAGNESIUM SULFATE 500 MG/ML
4 VIAL (ML) INJECTION ONCE
Refills: 0 | Status: COMPLETED | OUTPATIENT
Start: 2019-05-22 | End: 2019-05-22

## 2019-05-22 RX ORDER — SODIUM CHLORIDE 9 MG/ML
1000 INJECTION, SOLUTION INTRAVENOUS
Refills: 0 | Status: DISCONTINUED | OUTPATIENT
Start: 2019-05-22 | End: 2019-05-23

## 2019-05-22 RX ADMIN — Medication 50 GM/HR: at 01:30

## 2019-05-22 RX ADMIN — Medication 100 GRAM(S): at 01:00

## 2019-05-22 NOTE — PROGRESS NOTE ADULT - ASSESSMENT
20 yo P1, s/p , PPD5, preeclampsia w/ severe features, on Mg for seizure prophylaxis, BPs well controlled on magnesium, doing well.    - c/w Mg until  @0100  - neuro checks q2hrs  - pain management PRN  - regular diet, IV hydration  - seizure precautions  - strict I/Os, f/up UO  - monitor Bps q15min  - will repeat Mg + PELs @1900    Dr. Hernandez and Dr. Araujo aware.

## 2019-05-22 NOTE — PROGRESS NOTE ADULT - SUBJECTIVE AND OBJECTIVE BOX
Subjective:   Pt evaluated at bedside for magnesium check. She denies visual disturbances, headache and right upper quadrant pain. Also denies nausea/vomiting/chest pain/epigastric pain/shortness of breath. Pain well controlled.     Objective:   T(F): 98.6 ( @ 01:04), Max: 98.6 ( @ 01:04)  HR: 83 ( @ 06:06)  BP: 115/68 ( @ 05:36) (112/65 - 163/92)  RR: 18 ( @ 05:14)  SpO2: 96% ( 06:02)    Vital Signs Last 24 Hrs  BP: 115/68 (22 May 2019 05:36) (112/65 - 163/92)  Daily Height in cm: 154.94 (22 May 2019 01:04)    Daily Weight Pre-pregnancy in k (22 May 2019 01:04)    05 @ 07:01  -   @ 06:07  --------------------------------------------------------  IN: 750 mL / OUT: 1950 mL / NET: -1200 mL      Gen: NAD, AAOx3  CV: RRR, no M/R/G  Pulm: CTAB, no R/R/W  Abd: soft, nontender, no rebound or guarding, no epigastric tenderness, firm fundus below umbilicus  Ext: +2 edema in b/l LE, SCDs in place  Neuro: Reflexes 1+ bilaterally upper    Medications:  magnesium sulfate Infusion 2 Gm/Hr IV Continuous <Continuous>    dairy products (Unknown)  No Known Drug Allergies      Labs:                        14.1   8.53  )-----------( 218      ( 21 May 2019 22:30 )             41.3     05-21    143  |  109  |  12  ----------------------------<  82  4.1   |  21  |  0.5<L>    Ca    8.7      21 May 2019 22:30    TPro  6.0  /  Alb  3.6  /  TBili  <0.2  /  DBili  x   /  AST  51<H>  /  ALT  93<H>  /  AlkPhos  141<H>      Uric Acid, Serum: 4.5 mg/dL ( @ 22:30)

## 2019-05-22 NOTE — PROGRESS NOTE ADULT - SUBJECTIVE AND OBJECTIVE BOX
PGY2 Note:    Subjective:   Pt evaluated at bedside for magnesium check. She denies headache, vision changes, dizziness, SOB, chest pain, RUQ pain, or new onset swelling. Her pain is well controlled.     Objective:   Vital signs: T(F): 97.7 (05-22-19 @ 19:07), Max: 98 (05-22-19 @ 15:10)  HR: 87 (05-22-19 @ 19:37) (68 - 100)  BP: 114/71 (05-22-19 @ 19:32) (102/56 - 138/80)  RR: 20 (05-22-19 @ 19:07) (20 - 20)  SpO2: 96% (05-22-19 @ 19:37) (84% - 100%)    05-21-19 @ 07:01  -  05-22-19 @ 07:00  --------------------------------------------------------  IN: 750 mL / OUT: 1950 mL / NET: -1200 mL    05-22-19 @ 07:01  -  05-22-19 @ 19:41  --------------------------------------------------------  IN: 4000 mL / OUT: 6876 mL / NET: -2876 mL    UOP: 233cc/hr last 3 hours, clear, adequate    Gen: NAD, AAOx3  CV: S1S2, RRR, no M/R/G  Pulm: CTAB, no rhonchi or rales or wheezing  Abd: soft, gravid, nontender, no rebound or guarding, no epigastric tenderness  Ext: 1+ bilateral pitting edema, no calf tenderness, SCDs in place  Neuro: 2+ DTR bilaterally     Medications:  magnesium sulfate  IVPB: 100 (05-22-19 @ 01:00)  magnesium sulfate Infusion: 50 (05-22-19 @ 00:13)  NIFEdipine XL: 30 (05-21-19 @ 23:13)        Labs:                        13.8   8.36  )-----------( 240      ( 22 May 2019 17:41 )             39.4     05-22    144  |  107  |  8<L>  ----------------------------<  89  3.8   |  24  |  <0.5<L>    Ca    8.1<L>      22 May 2019 06:58  Mg     5.2     05-22    TPro  5.9<L>  /  Alb  3.6  /  TBili  0.3  /  DBili  x   /  AST  41  /  ALT  87<H>  /  AlkPhos  138<H>  05-22    Uric Acid, Serum: 4.8 mg/dL (05-22-19 @ 06:58)  Magnesium, Serum: 5.2 mg/dL (05-22-19 @ 06:58)  Uric Acid, Serum: 4.5 mg/dL (05-21-19 @ 22:30)

## 2019-05-22 NOTE — OB PROVIDER H&P - ASSESSMENT
18 y/o P1, s/p , PPD5, with likely preeclampsia with severe features (elevated LFTs).   - will start magnesium for seizure prophylaxis  - neuro checks q2h  - f/u BPs  - strict I/Os  - seizure precautions  - repeat PELs and Mg in 6 hours    Dr. Ryan and Dr. Araujo aware.

## 2019-05-22 NOTE — OB PROVIDER H&P - HISTORY OF PRESENT ILLNESS
20 y/o , s/p uncomplicated  on , sent by PMD office for elevated BPs in the office. Patient reports that today her legs felt swollen so she came to the office to be evaluated. Blood pressure in the office was 150/90. Denies headache, blurry vision, chest pain, SOB, epigastric pain. Reports leg swelling during the pregnancy but this morning became much worse. Denies elevated blood pressures in the pregnancy. Pregnancy and postpartum course uncomplicated. Patient had isolated severe BPs upon admission to L&D. Was started on procardia 30XL. Preeclamptic labs were drawn and LFTs were elevated. 20 y/o , s/p uncomplicated  on , sent by PMD office for elevated BPs in the office. Patient reports that today her legs felt swollen so she came to the office to be evaluated. Blood pressure in the office was 150/90. Denies headache, blurry vision, chest pain, SOB, epigastric pain. Reports leg swelling during the pregnancy but this morning became much worse. Denies elevated blood pressures in the pregnancy. Pregnancy and postpartum course uncomplicated. Patient had isolated severe BPs upon admission to L&D. Was given one dose of procardia 30XL. Preeclamptic labs were drawn and LFTs were elevated.

## 2019-05-22 NOTE — OB PROVIDER H&P - NSHPPHYSICALEXAM_GEN_ALL_CORE
Vital Signs Last 24 Hrs  T(C): 36.7 (21 May 2019 22:12), Max: 36.7 (21 May 2019 22:12)  T(F): 98 (21 May 2019 22:12), Max: 98 (21 May 2019 22:12)  HR: 68 (22 May 2019 00:19) (63 - 75)  BP: 155/81 (22 May 2019 00:19) (145/78 - 163/92)  BP(mean): --  RR: 20 (21 May 2019 22:12) (20 - 20)  SpO2: --    Udip; small blood    GEN: NAD, AAOX3  CVS: RRR, normal S1/S2  lungs; CTAB  abd: soft, nontender, no epigastric tenderness, firm fundus below umbilicus  ext: no calf tenderness, +2 pitting edema  SVE: deferred

## 2019-05-22 NOTE — PROGRESS NOTE ADULT - ASSESSMENT
20 yo P1, s/p , PPD5, with preeclampsia w/ severe features, on Mg for seizure prophylaxis, blood pressures well controlled on magnesium, with hazy vision, likely secondary to magnesium, otherwise asymptomatic    - c/w Mg until  @0100  - neuro checks q2hrs  - pain management PRN  - regular diet, IV hydration  - seizure precautions  - strict I/Os, f/up UO  - monitor Bps q15min  - will repeat Mg + PELs @1900 (ordered)    Dr. Harding and Dr. Araujo aware.

## 2019-05-22 NOTE — PROGRESS NOTE ADULT - ASSESSMENT
18yo P1, s/p , PPD#5, with preeclampsia with severe features, on Mg for seizure prophylaxis, blood pressures currently well controlled on magnesium, s/p Procardia 30mgXL X1, with elevated LFTs, currently stable and asymptomatic and doing well.   - c/w Mg for 24 hours total postpartum until 0100 on   - q2hour neuro checks  - regular diet, IV fluids, strict IOs  - seizure precautions  - monitor BPs q15min  - repeat PEL at 0500    Dr. Ryan aware, Dr. Jaramillo to be aware

## 2019-05-22 NOTE — PROGRESS NOTE ADULT - ASSESSMENT
20 yo P1, s/p , PPD5, with preeclampsia with severe features, on Mg for seizure prophylaxis, blood pressures well controlled on magnesium, asymptomatic  - c/w Mg for 24 hours total  - q2hour neuro checks  - pain management PRN  - regular diet  - IV fluids  - seizure precautions  - strict IOs  - monitor BPs q15min  - repeat CBC WNL, f/u CMP, Mg, Uric acid, LDH (received by lab)    Discussed with Dr. Greer and Dr. Araujo

## 2019-05-22 NOTE — PROGRESS NOTE ADULT - ASSESSMENT
20 yo P1, s/p , PPD5, preeclampsia w/ severe features, on Mg for seizure prophylaxis, BPs well controlled on magnesium, doing well.    - c/w Mg until  @0100  - neuro checks q2hrs  - pain management PRN  - regular diet, IV hydration  - seizure precautions  - strict I/Os, f/up UO  - monitor Bps q15min  - will repeat Mg + PELs @1900    Dr. Lama and Dr. Araujo aware.

## 2019-05-22 NOTE — PROGRESS NOTE ADULT - SUBJECTIVE AND OBJECTIVE BOX
PGY1 Magnesium Note     Subjective:   Pt evaluated at bedside for magnesium check. She denies headache, vision changes, dizziness, SOB, chest pain, RUQ/epigastric pain.    Objective:   Vital signs: T(F): 97.7 (19 @ 19:07), Max: 98 (19 @ 15:10)  HR: 79 (19 @ 22:42) (75 - 105)  BP: 110/66 (19 @ 22:32) (102/56 - 138/80)  RR: 20 (19 @ 19:07) (20 - 20)  SpO2: 98% (19 @ 22:42) (96% - 98%)    19 07:01  -  19 07:00  --------------------------------------------------------  IN: 750 mL / OUT: 1950 mL / NET: -1200 mL    19 @ 07:01  -  19 @ 22:47  --------------------------------------------------------  IN: 4000 mL / OUT: 8126 mL / NET: -4126 mL    UO: 600-1200cc/hour    Gen: NAD, AAOx3  CV: S1S2, RRR, no M/R/G  Pulm: CTAB, no rhonchi or rales or wheezingExt: no calf tenderness or edema SCDs in place  Neuro: 2+ DTR bilaterally  Abd: soft, nontender, no rebound or guarding, no epigastric tenderness    Medications:  (Floorstock): 1 (19 @ 00:47)  (Floorstock): 1 (19 @ 04:50)  magnesium sulfate  IVPB: 100 (19 @ 01:00)  magnesium sulfate Infusion: 50 (19 @ 00:13)  NIFEdipine XL: 30 (19 @ 23:13)      Labs:                         13.8   8.36  )-----------( 240      ( 22 May 2019 17:41 )             39.4       142  |  104  |  6<L>  ----------------------------<  103<H>  3.6   |  24  |  <0.5<L>    Ca    7.4<L>      22 May 2019 17:41  Mg     6.3         TPro  6.0  /  Alb  3.6  /  TBili  0.2  /  DBili  x   /  AST  44<H>  /  ALT  90<H>  /  AlkPhos  140<H>        Urinalysis Basic - ( 21 May 2019 22:30 )  Color: Yellow / Appearance: Clear / S.015 / pH: x  Gluc: x / Ketone: Negative  / Bili: Negative / Urobili: 0.2 mg/dL   Blood: x / Protein: Negative mg/dL / Nitrite: Negative   Leuk Esterase: Small / RBC: 6-10 /HPF / WBC 10-25 /HPF   Sq Epi: x / Non Sq Epi: Few /HPF / Bacteria: Few /HPF

## 2019-05-22 NOTE — PROGRESS NOTE ADULT - SUBJECTIVE AND OBJECTIVE BOX
PGY1 note Mag Note    Subjective:   Pt evaluated at bedside for magnesium check. She reports mild hazy vision, no visual spotting or double vision. She denies headache and right upper quadrant pain. Also denies nausea/vomiting/chest pain/epigastric pain/shortness of breath. Pain well controlled.     Objective:   Vital signs: T(F): 98 (05-22-19 @ 15:10), Max: 98 (05-22-19 @ 15:10)  HR: 85 (05-22-19 @ 18:25) (68 - 100)  BP: 120/72 (05-22-19 @ 18:25) (102/56 - 138/80)  SpO2: 97% (05-22-19 @ 10:29) (84% - 100%)      05-21-19 @ 07:01  -  05-22-19 @ 07:00  -------------------------------------------------------- [id number] (650cc/h)- adequate 36.5cc/h  IN: 750 mL / OUT: 1950 mL / NET: -1200 mL    05-22-19 @ 07:01  -  05-22-19 @ 18:28  --------------------------------------------------------  IN: 3300 mL / OUT: 6176 mL / NET: -2876 mL        Gen: NAD, AAOx3  CV: S1S2, RRR, no M/R/G  Pulm: CTAB, no rhonchi or rales or wheezing  Abd: soft, gravid, nontender, no rebound or guarding, no epigastric tenderness  : ambulating to the bathroom UO adequate  Ext: no new onset swelling. SCDs in place  Neuro: +2 UE reflexes b/l        Medications:  magnesium sulfate  IVPB: 100 (05-22-19 @ 01:00)  magnesium sulfate Infusion: 50 (05-22-19 @ 00:13)  NIFEdipine XL: 30 (05-21-19 @ 23:13)        Labs:                        13.6   8.92  )-----------( 210      ( 22 May 2019 06:58 )             39.4     05-22    144  |  107  |  8<L>  ----------------------------<  89  3.8   |  24  |  <0.5<L>    Ca    8.1<L>      22 May 2019 06:58  Mg     5.2     05-22    TPro  5.9<L>  /  Alb  3.6  /  TBili  0.3  /  DBili  x   /  AST  41  /  ALT  87<H>  /  AlkPhos  138<H>  05-22    Uric Acid, Serum: 4.8 mg/dL (05-22-19 @ 06:58)  Magnesium, Serum: 5.2 mg/dL (05-22-19 @ 06:58)  Uric Acid, Serum: 4.5 mg/dL (05-21-19 @ 22:30)

## 2019-05-22 NOTE — OB PROVIDER H&P - NSHPLABSRESULTS_GEN_ALL_CORE
14.1   8.53  )-----------( 218      ( 21 May 2019 22:30 )             41.3     05-    143  |  109  |  12  ----------------------------<  82  4.1   |  21  |  0.5<L>    Ca    8.7      21 May 2019 22:30    TPro  6.0  /  Alb  3.6  /  TBili  <0.2  /  DBili  x   /  AST  51<H>  /  ALT  93<H>  /  AlkPhos  141<H>      uric acid 4.5,     Urinalysis Basic - ( 21 May 2019 22:30 )    Color: Yellow / Appearance: Clear / S.015 / pH: x  Gluc: x / Ketone: Negative  / Bili: Negative / Urobili: 0.2 mg/dL   Blood: x / Protein: Negative mg/dL / Nitrite: Negative   Leuk Esterase: Small / RBC: 6-10 /HPF / WBC 10-25 /HPF   Sq Epi: x / Non Sq Epi: Few /HPF / Bacteria: Few /HPF

## 2019-05-22 NOTE — PROGRESS NOTE ADULT - SUBJECTIVE AND OBJECTIVE BOX
PGY I Progress Note    Subjective: Pt evaluated at bedside for magnesium check. She denies HA, visual disturbances, CP, SOB, N/V, RUQ/epigastric pain, LE pain.    Objective:   Vital Signs Last 24 Hrs  T(F): 97.9 (22 May 2019 07:20), Max: 98.6 (22 May 2019 01:04)  HR: 77 (22 May 2019 14:55) (60 - 99)  BP: 118/73 (22 May 2019 14:55) (107/65 - 163/92)  RR: 18 (22 May 2019 05:46) (18 - 20)  SpO2: 97% (22 May 2019 10:29) (84% - 100%)    UO: ([id number]) 1600cc    Gen: NAD, AAOx3  CV: RRR, no M/R/G  Pulm: CTAB, no R/R/W  Abd: soft, nontender, no RUQ/epigastric pain  Ext: SCDs in place b/l  Neuro: Reflexes 2+ b/l UE    Medications:  lactated ringers. 1000 milliLiter(s) IV Continuous <Continuous>  magnesium sulfate Infusion 2 Gm/Hr IV Continuous <Continuous>    dairy products (Unknown)  No Known Drug Allergies      Labs:                        13.6   8.92  )-----------( 210      ( 22 May 2019 06:58 )             39.4     05-22    144  |  107  |  8<L>  ----------------------------<  89  3.8   |  24  |  <0.5<L>    Ca    8.1<L>      22 May 2019 06:58  Mg     5.2     05-22    TPro  5.9<L>  /  Alb  3.6  /  TBili  0.3  /  DBili  x   /  AST  41  /  ALT  87<H>  /  AlkPhos  138<H>  05-22    Uric Acid, Serum: 4.8 mg/dL (05-22 @ 06:58)  Magnesium, Serum: 5.2 mg/dL (05-22 @ 06:58)  Uric Acid, Serum: 4.5 mg/dL (05-21 @ 22:30)

## 2019-05-22 NOTE — PROGRESS NOTE ADULT - SUBJECTIVE AND OBJECTIVE BOX
Subjective:   Pt evaluated at bedside for magnesium check. She denies visual disturbances, headache and right upper quadrant pain. Also denies nausea/vomiting/chest pain/epigastric pain/shortness of breath. Pain well controlled.     Objective:   T(F): 98.6 ( @ 01:04), Max: 98.6 ( @ 01:04)  HR: 71 ( @ 03:26)  BP: 114/64 ( @ 03:15) (114/63 - 163/92)  RR: 18 ( 03:13)  SpO2: 94% ( 03:31)  Vital Signs Last 24 Hrs  BP: 114/64 (22 May 2019 03:15) (114/63 - 163/92)  Daily Height in cm: 154.94 (22 May 2019 01:04)    Daily Weight Pre-pregnancy in k (22 May 2019 01:04)    05 @ 07:01  -   @ 03:34  --------------------------------------------------------  IN: 250 mL / OUT: 650 mL / NET: -400 mL      Gen: NAD, AAOx3  CV: RRR, no M/R/G  Pulm: CTAB, no R/R/W  Abd: soft, nontender, no rebound or guarding, no epigastric tenderness, firm fundus below umbilicus  Ext: +2 edema in b/l LE, SCDs in place  Neuro: Reflexes 2+ bilaterally upper    Medications:  magnesium sulfate Infusion 2 Gm/Hr IV Continuous <Continuous>    dairy products (Unknown)  No Known Drug Allergies      Labs:                        14.1   8.53  )-----------( 218      ( 21 May 2019 22:30 )             41.3     05-    143  |  109  |  12  ----------------------------<  82  4.1   |  21  |  0.5<L>    Ca    8.7      21 May 2019 22:30    TPro  6.0  /  Alb  3.6  /  TBili  <0.2  /  DBili  x   /  AST  51<H>  /  ALT  93<H>  /  AlkPhos  141<H>      Uric Acid, Serum: 4.5 mg/dL ( @ 22:30)

## 2019-05-22 NOTE — PROGRESS NOTE ADULT - SUBJECTIVE AND OBJECTIVE BOX
PGY I Progress Note    Subjective: Pt evaluated at bedside for magnesium check. Doing well, pain controlled. She denies HA, visual disturbances, CP, SOB, N/V, RUQ/epigastric pain, LE pain.    Objective:   Vital Signs Last 24 Hrs  T(F): 97.9 (22 May 2019 07:20), Max: 98.6 (22 May 2019 01:04)  HR: 90 (22 May 2019 11:40) (60 - 99)  BP: 120/79 (22 May 2019 11:40) (108/63 - 163/92)  RR: 18 (22 May 2019 05:46) (18 - 20)  SpO2: 97% (22 May 2019 10:29) (84% - 100%)    UP: (8030-6331) 3225cc    Gen: NAD, AAOx3  CV: RRR, no M/R/G  Pulm: CTAB, no R/R/W  Abd: soft, nontender, no RUQ/epigastric pain  Ext: b/l SCDs in place  Neuro: Reflexes 2+ b/l UE    Medications:  @2313 procardia 30xl  @0100 Mg started 2g/hr      Labs:                        13.6   8.92  )-----------( 210      ( 22 May 2019 06:58 )             39.4     05-22    144  |  107  |  8<L>  ----------------------------<  89  3.8   |  24  |  <0.5<L>    Ca    8.1<L>      22 May 2019 06:58  Mg     5.2     05-22    TPro  5.9<L>  /  Alb  3.6  /  TBili  0.3  /  DBili  x   /  AST  41  /  ALT  87<H>  /  AlkPhos  138<H>  05-22    Uric Acid, Serum: 4.8 mg/dL (05-22 @ 06:58)  Magnesium, Serum: 5.2 mg/dL (05-22 @ 06:58)  Uric Acid, Serum: 4.5 mg/dL (05-21 @ 22:30)

## 2019-05-22 NOTE — PROGRESS NOTE ADULT - ASSESSMENT
20 y/o P1, s/p , PPD5, with likely preeclampsia with severe features (elevated LFTs), now with mild range BPs, on magnesium for seizure prophylaxis.  - c/w magnesium for a total of 24 hours ( 0100)  - neuro checks q2h  - f/u BPs  - strict I/Os  - seizure precautions  - PELs and mag @0700    Dr. Ryan and Dr. Araujo aware.

## 2019-05-23 ENCOUNTER — TRANSCRIPTION ENCOUNTER (OUTPATIENT)
Age: 19
End: 2019-05-23

## 2019-05-23 VITALS — HEART RATE: 65 BPM | SYSTOLIC BLOOD PRESSURE: 113 MMHG | DIASTOLIC BLOOD PRESSURE: 73 MMHG

## 2019-05-23 LAB
ALBUMIN SERPL ELPH-MCNC: 3.4 G/DL — LOW (ref 3.5–5.2)
ALP SERPL-CCNC: 130 U/L — HIGH (ref 30–115)
ALT FLD-CCNC: 73 U/L — HIGH (ref 0–41)
ANION GAP SERPL CALC-SCNC: 13 MMOL/L — SIGNIFICANT CHANGE UP (ref 7–14)
AST SERPL-CCNC: 28 U/L — SIGNIFICANT CHANGE UP (ref 0–41)
BASOPHILS # BLD AUTO: 0.05 K/UL — SIGNIFICANT CHANGE UP (ref 0–0.2)
BASOPHILS NFR BLD AUTO: 0.6 % — SIGNIFICANT CHANGE UP (ref 0–1)
BILIRUB SERPL-MCNC: 0.3 MG/DL — SIGNIFICANT CHANGE UP (ref 0.2–1.2)
BUN SERPL-MCNC: 6 MG/DL — LOW (ref 10–20)
CALCIUM SERPL-MCNC: 7.2 MG/DL — LOW (ref 8.5–10.1)
CHLORIDE SERPL-SCNC: 106 MMOL/L — SIGNIFICANT CHANGE UP (ref 98–110)
CO2 SERPL-SCNC: 24 MMOL/L — SIGNIFICANT CHANGE UP (ref 17–32)
CREAT SERPL-MCNC: 0.5 MG/DL — LOW (ref 0.7–1.5)
EOSINOPHIL # BLD AUTO: 0.28 K/UL — SIGNIFICANT CHANGE UP (ref 0–0.7)
EOSINOPHIL NFR BLD AUTO: 3.2 % — SIGNIFICANT CHANGE UP (ref 0–8)
GLUCOSE SERPL-MCNC: 88 MG/DL — SIGNIFICANT CHANGE UP (ref 70–99)
HCT VFR BLD CALC: 39.3 % — SIGNIFICANT CHANGE UP (ref 37–47)
HGB BLD-MCNC: 13.7 G/DL — SIGNIFICANT CHANGE UP (ref 12–16)
IMM GRANULOCYTES NFR BLD AUTO: 0.3 % — SIGNIFICANT CHANGE UP (ref 0.1–0.3)
LDH SERPL L TO P-CCNC: 266 — HIGH (ref 50–242)
LYMPHOCYTES # BLD AUTO: 1.78 K/UL — SIGNIFICANT CHANGE UP (ref 1.2–3.4)
LYMPHOCYTES # BLD AUTO: 20.6 % — SIGNIFICANT CHANGE UP (ref 20.5–51.1)
MCHC RBC-ENTMCNC: 30 PG — SIGNIFICANT CHANGE UP (ref 27–31)
MCHC RBC-ENTMCNC: 34.9 G/DL — SIGNIFICANT CHANGE UP (ref 32–37)
MCV RBC AUTO: 86 FL — SIGNIFICANT CHANGE UP (ref 81–99)
MONOCYTES # BLD AUTO: 0.75 K/UL — HIGH (ref 0.1–0.6)
MONOCYTES NFR BLD AUTO: 8.7 % — SIGNIFICANT CHANGE UP (ref 1.7–9.3)
NEUTROPHILS # BLD AUTO: 5.74 K/UL — SIGNIFICANT CHANGE UP (ref 1.4–6.5)
NEUTROPHILS NFR BLD AUTO: 66.6 % — SIGNIFICANT CHANGE UP (ref 42.2–75.2)
NRBC # BLD: 0 /100 WBCS — SIGNIFICANT CHANGE UP (ref 0–0)
PLATELET # BLD AUTO: 225 K/UL — SIGNIFICANT CHANGE UP (ref 130–400)
POTASSIUM SERPL-MCNC: 3.6 MMOL/L — SIGNIFICANT CHANGE UP (ref 3.5–5)
POTASSIUM SERPL-SCNC: 3.6 MMOL/L — SIGNIFICANT CHANGE UP (ref 3.5–5)
PROT SERPL-MCNC: 5.8 G/DL — LOW (ref 6–8)
RBC # BLD: 4.57 M/UL — SIGNIFICANT CHANGE UP (ref 4.2–5.4)
RBC # FLD: 13.4 % — SIGNIFICANT CHANGE UP (ref 11.5–14.5)
SODIUM SERPL-SCNC: 143 MMOL/L — SIGNIFICANT CHANGE UP (ref 135–146)
URATE SERPL-MCNC: 5.8 MG/DL — SIGNIFICANT CHANGE UP (ref 2.5–7)
WBC # BLD: 8.63 K/UL — SIGNIFICANT CHANGE UP (ref 4.8–10.8)
WBC # FLD AUTO: 8.63 K/UL — SIGNIFICANT CHANGE UP (ref 4.8–10.8)

## 2019-05-23 PROCEDURE — 99231 SBSQ HOSP IP/OBS SF/LOW 25: CPT

## 2019-05-23 NOTE — PROGRESS NOTE ADULT - SUBJECTIVE AND OBJECTIVE BOX
PGY 3 Note:    Patient seen at bedside feeling well. Denies headache, vision changes, chest pain, shortness of breath, right upper or epigastric abdominal pain, new swelling.    Vital Signs Last 24 Hrs  T(C): 36.5 (22 May 2019 19:07), Max: 36.7 (22 May 2019 15:10)  T(F): 97.7 (22 May 2019 19:07), Max: 98 (22 May 2019 15:10)  HR: 65 (23 May 2019 05:32) (61 - 105)  BP: 113/73 (23 May 2019 05:32) (98/64 - 138/80)  BP(mean): --  RR: 16 (22 May 2019 23:30) (16 - 20)  SpO2: 97% (22 May 2019 23:45) (84% - 100%)    Gen: NAD, A&OX3  Abd: ND, no RUQ/epigastric tenderness, NT  VE: Deferred  Ext: SCDs, no edema or calf tenderness  Neuro: DTRs 2+ B/L UE    Labs:                          13.7   8.63  )-----------( 225      ( 23 May 2019 05:00 )             39.3   05-23    143  |  106  |  6<L>  ----------------------------<  88  3.6   |  24  |  0.5<L>    Ca    7.2<L>      23 May 2019 05:00  Mg     6.3     05-22    TPro  5.8<L>  /  Alb  3.4<L>  /  TBili  0.3  /  DBili  x   /  AST  28  /  ALT  73<H>  /  AlkPhos  130<H>  05-23      MEDICATIONS  (STANDING):    MEDICATIONS  (PRN):

## 2019-05-23 NOTE — DISCHARGE NOTE OB - CARE PROVIDER_API CALL
Roque Jaramillo)  OBSGYN  Physicians  [address] [place] [address]  Phone: [phone]  Fax: [phone]  Follow Up Time:

## 2019-05-23 NOTE — DISCHARGE NOTE OB - HOSPITAL COURSE
Patient admitted for severe preeclampsia. Given procardia 30XL prior to magnesium being started. 24 hours of magnesium were given. Patient's preeclamptic labs were normal except for her AST/ALT, which were resolving after Mag treatment. Her blood pressures were in the normal range and she was discharged home.

## 2019-05-23 NOTE — PROGRESS NOTE ADULT - ASSESSMENT
20 yo P1, s/p , PPD #6, with preeclampsia with severe features s/p Mg for seizure prophylaxis, blood pressures in normal range, with elevated LFTs that are resolvibg, currently stable and asymptomatic and doing well    - Discharge home per PMD  - BP check in office   - Precautions given    Dr. Jaramillo aware

## 2019-05-23 NOTE — DISCHARGE NOTE OB - AVOID DOUCHING OR TAMPONS UNTIL YOUR POSTPARTUM VISIT
pt seen and dw housestaff.  orthostatic- will change terazosin to flomax (rapaflo would be most preferable upon dc)  PT follow up
Statement Selected

## 2019-05-23 NOTE — DISCHARGE NOTE OB - PATIENT PORTAL LINK FT
You can access the [facility], offered by [facility], by registering with the following website: [email or web address]

## 2019-05-23 NOTE — DISCHARGE NOTE OB - CARE PLAN
Principal Discharge DX:	Preeclampsia, severe  Goal:	Normal blood pressures  Assessment and plan of treatment:	Follow up with OBGYN in 1 week for blood pressure check  Preeclamptic precautions given  Discharge home

## 2019-05-23 NOTE — DISCHARGE NOTE OB - PLAN OF CARE
Normal blood pressures Follow up with OBGYN in 1 week for blood pressure check  Preeclamptic precautions given  Discharge home

## 2020-04-07 ENCOUNTER — APPOINTMENT (OUTPATIENT)
Dept: ANTEPARTUM | Facility: CLINIC | Age: 20
End: 2020-04-07

## 2020-08-12 ENCOUNTER — INPATIENT (INPATIENT)
Facility: HOSPITAL | Age: 20
LOS: 0 days | Discharge: HOME | End: 2020-08-13
Attending: OBSTETRICS & GYNECOLOGY | Admitting: OBSTETRICS & GYNECOLOGY
Payer: MEDICAID

## 2020-08-12 ENCOUNTER — TRANSCRIPTION ENCOUNTER (OUTPATIENT)
Age: 20
End: 2020-08-12

## 2020-08-12 VITALS
WEIGHT: 286.6 LBS | DIASTOLIC BLOOD PRESSURE: 75 MMHG | HEIGHT: 61 IN | TEMPERATURE: 98 F | RESPIRATION RATE: 16 BRPM | SYSTOLIC BLOOD PRESSURE: 129 MMHG | HEART RATE: 104 BPM

## 2020-08-12 LAB
AMPHET UR-MCNC: NEGATIVE — SIGNIFICANT CHANGE UP
APPEARANCE UR: CLEAR — SIGNIFICANT CHANGE UP
BARBITURATES UR SCN-MCNC: NEGATIVE — SIGNIFICANT CHANGE UP
BASOPHILS # BLD AUTO: 0.02 K/UL — SIGNIFICANT CHANGE UP (ref 0–0.2)
BASOPHILS NFR BLD AUTO: 0.2 % — SIGNIFICANT CHANGE UP (ref 0–1)
BENZODIAZ UR-MCNC: NEGATIVE — SIGNIFICANT CHANGE UP
BILIRUB UR-MCNC: NEGATIVE — SIGNIFICANT CHANGE UP
BLD GP AB SCN SERPL QL: SIGNIFICANT CHANGE UP
BUPRENORPHINE SCREEN, URINE RESULT: NEGATIVE — SIGNIFICANT CHANGE UP
COCAINE METAB.OTHER UR-MCNC: NEGATIVE — SIGNIFICANT CHANGE UP
COLOR SPEC: SIGNIFICANT CHANGE UP
DIFF PNL FLD: NEGATIVE — SIGNIFICANT CHANGE UP
EOSINOPHIL # BLD AUTO: 0.01 K/UL — SIGNIFICANT CHANGE UP (ref 0–0.7)
EOSINOPHIL NFR BLD AUTO: 0.1 % — SIGNIFICANT CHANGE UP (ref 0–8)
FENTANYL UR QL: NEGATIVE — SIGNIFICANT CHANGE UP
GLUCOSE UR QL: NEGATIVE — SIGNIFICANT CHANGE UP
HCT VFR BLD CALC: 42.6 % — SIGNIFICANT CHANGE UP (ref 37–47)
HGB BLD-MCNC: 14.7 G/DL — SIGNIFICANT CHANGE UP (ref 12–16)
IMM GRANULOCYTES NFR BLD AUTO: 0.5 % — HIGH (ref 0.1–0.3)
KETONES UR-MCNC: ABNORMAL
L&D DRUG SCREEN, URINE: SIGNIFICANT CHANGE UP
LEUKOCYTE ESTERASE UR-ACNC: NEGATIVE — SIGNIFICANT CHANGE UP
LYMPHOCYTES # BLD AUTO: 1.38 K/UL — SIGNIFICANT CHANGE UP (ref 1.2–3.4)
LYMPHOCYTES # BLD AUTO: 14 % — LOW (ref 20.5–51.1)
MCHC RBC-ENTMCNC: 29.7 PG — SIGNIFICANT CHANGE UP (ref 27–31)
MCHC RBC-ENTMCNC: 34.5 G/DL — SIGNIFICANT CHANGE UP (ref 32–37)
MCV RBC AUTO: 86.1 FL — SIGNIFICANT CHANGE UP (ref 81–99)
METHADONE UR-MCNC: NEGATIVE — SIGNIFICANT CHANGE UP
MONOCYTES # BLD AUTO: 0.58 K/UL — SIGNIFICANT CHANGE UP (ref 0.1–0.6)
MONOCYTES NFR BLD AUTO: 5.9 % — SIGNIFICANT CHANGE UP (ref 1.7–9.3)
NEUTROPHILS # BLD AUTO: 7.82 K/UL — HIGH (ref 1.4–6.5)
NEUTROPHILS NFR BLD AUTO: 79.3 % — HIGH (ref 42.2–75.2)
NITRITE UR-MCNC: NEGATIVE — SIGNIFICANT CHANGE UP
NRBC # BLD: 0 /100 WBCS — SIGNIFICANT CHANGE UP (ref 0–0)
OPIATES UR-MCNC: NEGATIVE — SIGNIFICANT CHANGE UP
OXYCODONE UR-MCNC: NEGATIVE — SIGNIFICANT CHANGE UP
PCP UR-MCNC: NEGATIVE — SIGNIFICANT CHANGE UP
PH UR: 6.5 — SIGNIFICANT CHANGE UP (ref 5–8)
PLATELET # BLD AUTO: 187 K/UL — SIGNIFICANT CHANGE UP (ref 130–400)
PRENATAL SYPHILIS TEST: SIGNIFICANT CHANGE UP
PROPOXYPHENE QUALITATIVE URINE RESULT: NEGATIVE — SIGNIFICANT CHANGE UP
PROT UR-MCNC: NEGATIVE — SIGNIFICANT CHANGE UP
RBC # BLD: 4.95 M/UL — SIGNIFICANT CHANGE UP (ref 4.2–5.4)
RBC # FLD: 13.8 % — SIGNIFICANT CHANGE UP (ref 11.5–14.5)
SARS-COV-2 RNA SPEC QL NAA+PROBE: SIGNIFICANT CHANGE UP
SP GR SPEC: 1.01 — SIGNIFICANT CHANGE UP (ref 1.01–1.02)
UROBILINOGEN FLD QL: SIGNIFICANT CHANGE UP
WBC # BLD: 9.86 K/UL — SIGNIFICANT CHANGE UP (ref 4.8–10.8)
WBC # FLD AUTO: 9.86 K/UL — SIGNIFICANT CHANGE UP (ref 4.8–10.8)

## 2020-08-12 PROCEDURE — 59409 OBSTETRICAL CARE: CPT | Mod: U9

## 2020-08-12 RX ORDER — SIMETHICONE 80 MG/1
80 TABLET, CHEWABLE ORAL EVERY 4 HOURS
Refills: 0 | Status: DISCONTINUED | OUTPATIENT
Start: 2020-08-12 | End: 2020-08-13

## 2020-08-12 RX ORDER — DIPHENHYDRAMINE HCL 50 MG
25 CAPSULE ORAL EVERY 6 HOURS
Refills: 0 | Status: DISCONTINUED | OUTPATIENT
Start: 2020-08-12 | End: 2020-08-13

## 2020-08-12 RX ORDER — PRAMOXINE HYDROCHLORIDE 150 MG/15G
1 AEROSOL, FOAM RECTAL EVERY 4 HOURS
Refills: 0 | Status: DISCONTINUED | OUTPATIENT
Start: 2020-08-12 | End: 2020-08-13

## 2020-08-12 RX ORDER — HYDROCORTISONE 1 %
1 OINTMENT (GRAM) TOPICAL EVERY 6 HOURS
Refills: 0 | Status: DISCONTINUED | OUTPATIENT
Start: 2020-08-12 | End: 2020-08-13

## 2020-08-12 RX ORDER — MAGNESIUM HYDROXIDE 400 MG/1
30 TABLET, CHEWABLE ORAL
Refills: 0 | Status: DISCONTINUED | OUTPATIENT
Start: 2020-08-12 | End: 2020-08-13

## 2020-08-12 RX ORDER — SODIUM CHLORIDE 9 MG/ML
1000 INJECTION, SOLUTION INTRAVENOUS
Refills: 0 | Status: DISCONTINUED | OUTPATIENT
Start: 2020-08-12 | End: 2020-08-12

## 2020-08-12 RX ORDER — OXYCODONE HYDROCHLORIDE 5 MG/1
5 TABLET ORAL ONCE
Refills: 0 | Status: DISCONTINUED | OUTPATIENT
Start: 2020-08-12 | End: 2020-08-13

## 2020-08-12 RX ORDER — SODIUM CHLORIDE 9 MG/ML
3 INJECTION INTRAMUSCULAR; INTRAVENOUS; SUBCUTANEOUS EVERY 8 HOURS
Refills: 0 | Status: DISCONTINUED | OUTPATIENT
Start: 2020-08-12 | End: 2020-08-13

## 2020-08-12 RX ORDER — OXYCODONE HYDROCHLORIDE 5 MG/1
5 TABLET ORAL
Refills: 0 | Status: DISCONTINUED | OUTPATIENT
Start: 2020-08-12 | End: 2020-08-13

## 2020-08-12 RX ORDER — BENZOCAINE 10 %
1 GEL (GRAM) MUCOUS MEMBRANE EVERY 6 HOURS
Refills: 0 | Status: DISCONTINUED | OUTPATIENT
Start: 2020-08-12 | End: 2020-08-13

## 2020-08-12 RX ORDER — AER TRAVELER 0.5 G/1
1 SOLUTION RECTAL; TOPICAL EVERY 4 HOURS
Refills: 0 | Status: DISCONTINUED | OUTPATIENT
Start: 2020-08-12 | End: 2020-08-13

## 2020-08-12 RX ORDER — DIBUCAINE 1 %
1 OINTMENT (GRAM) RECTAL EVERY 6 HOURS
Refills: 0 | Status: DISCONTINUED | OUTPATIENT
Start: 2020-08-12 | End: 2020-08-13

## 2020-08-12 RX ORDER — IBUPROFEN 200 MG
600 TABLET ORAL EVERY 6 HOURS
Refills: 0 | Status: DISCONTINUED | OUTPATIENT
Start: 2020-08-12 | End: 2020-08-13

## 2020-08-12 RX ORDER — DEXAMETHASONE 0.5 MG/5ML
4 ELIXIR ORAL EVERY 6 HOURS
Refills: 0 | Status: DISCONTINUED | OUTPATIENT
Start: 2020-08-12 | End: 2020-08-13

## 2020-08-12 RX ORDER — ONDANSETRON 8 MG/1
4 TABLET, FILM COATED ORAL EVERY 6 HOURS
Refills: 0 | Status: DISCONTINUED | OUTPATIENT
Start: 2020-08-12 | End: 2020-08-13

## 2020-08-12 RX ORDER — OXYTOCIN 10 UNIT/ML
333.33 VIAL (ML) INJECTION
Qty: 20 | Refills: 0 | Status: DISCONTINUED | OUTPATIENT
Start: 2020-08-12 | End: 2020-08-13

## 2020-08-12 RX ORDER — ACETAMINOPHEN 500 MG
3 TABLET ORAL
Qty: 0 | Refills: 0 | DISCHARGE
Start: 2020-08-12

## 2020-08-12 RX ORDER — KETOROLAC TROMETHAMINE 30 MG/ML
30 SYRINGE (ML) INJECTION ONCE
Refills: 0 | Status: DISCONTINUED | OUTPATIENT
Start: 2020-08-12 | End: 2020-08-12

## 2020-08-12 RX ORDER — IBUPROFEN 200 MG
1 TABLET ORAL
Qty: 0 | Refills: 0 | DISCHARGE
Start: 2020-08-12

## 2020-08-12 RX ORDER — ACETAMINOPHEN 500 MG
975 TABLET ORAL
Refills: 0 | Status: DISCONTINUED | OUTPATIENT
Start: 2020-08-12 | End: 2020-08-13

## 2020-08-12 RX ORDER — IBUPROFEN 200 MG
600 TABLET ORAL EVERY 6 HOURS
Refills: 0 | Status: COMPLETED | OUTPATIENT
Start: 2020-08-12 | End: 2021-07-11

## 2020-08-12 RX ORDER — LANOLIN
1 OINTMENT (GRAM) TOPICAL EVERY 6 HOURS
Refills: 0 | Status: DISCONTINUED | OUTPATIENT
Start: 2020-08-12 | End: 2020-08-13

## 2020-08-12 RX ORDER — NALOXONE HYDROCHLORIDE 4 MG/.1ML
0.1 SPRAY NASAL
Refills: 0 | Status: DISCONTINUED | OUTPATIENT
Start: 2020-08-12 | End: 2020-08-13

## 2020-08-12 RX ORDER — BUPIVACAINE HCL/PF 7.5 MG/ML
200 VIAL (ML) INJECTION
Refills: 0 | Status: DISCONTINUED | OUTPATIENT
Start: 2020-08-12 | End: 2020-08-13

## 2020-08-12 RX ADMIN — Medication 975 MILLIGRAM(S): at 21:45

## 2020-08-12 RX ADMIN — SODIUM CHLORIDE 3 MILLILITER(S): 9 INJECTION INTRAMUSCULAR; INTRAVENOUS; SUBCUTANEOUS at 21:48

## 2020-08-12 RX ADMIN — Medication 975 MILLIGRAM(S): at 22:30

## 2020-08-12 RX ADMIN — Medication 30 MILLIGRAM(S): at 18:24

## 2020-08-12 NOTE — OB PROVIDER H&P - ASSESSMENT
21yo  @40w1d, SIENNA 20, GBS neg, in early labor.  - admit to L&D  - IVF hydration  - continuous ECM/Kinde  - monitor vitals  - f/u admission labs  - pain management prn  - clear liquid diet     and  aware    Addendum:  Patient was AROM'ed (clear) by Dr. Jaramillo.  SVE: /0/vertex/ruptured,clear

## 2020-08-12 NOTE — OB PROVIDER H&P - NSHPPHYSICALEXAM_GEN_ALL_CORE
Vital Signs Last 24 Hrs  T(C): 36.8 (12 Aug 2020 13:18), Max: 36.8 (12 Aug 2020 13:18)  T(F): 98.2 (12 Aug 2020 13:18), Max: 98.2 (12 Aug 2020 13:18)  HR: 100 (12 Aug 2020 13:53) (100 - 104)  BP: 121/69 (12 Aug 2020 13:53) (121/69 - 129/75)  RR: 16 (12 Aug 2020 13:18) (16 - 16)    Gen: NAD, AOx3  Abd: soft, nontender, gravid  Ext: no swelling    ECM: 140/mod ronald/+accels  Keats: q7min  SVE: 5/90/0/vertex/intact

## 2020-08-12 NOTE — OB PROVIDER H&P - HISTORY OF PRESENT ILLNESS
19yo  @40w1d by 2nd trimester sono, SIENNA 20, sent to L&D from clinic as patient was 5cm dilated. Contractions started at 0700 today, irregular frequency, 8/10 in severity. Denies VB and LOF. Reports good FM. Denies complications during pregnancy. Has been on ASA for h/o preeclampsia in prior pregnancy. Last dose of ASA before 36weeks as per patient. GBS negative.

## 2020-08-12 NOTE — PROCEDURE NOTE - NSSITEPREP_SKIN_A_CORE
Betadine x 3/Adherence to aseptic technique: hand hygiene prior to donning barriers (gown, gloves), don cap and mask, sterile drape over patient

## 2020-08-12 NOTE — OB PROVIDER DELIVERY SUMMARY - NSPROVIDERDELIVERYNOTE_OBGYN_ALL_OB_FT
Patient was fully dilated and pushing. Fetal head was OA and restituted to ROT. The anterior and posterior shoulders delivered, followed by the remaining body atraumatically. Delayed cord clamping was performed, and then clamped and cut. Cord blood gases collected x2. The  was handed to the mother. The placenta delivered intact with membranes. Pitocin was administered. Uterus massaged, fundus found to be firm. Cervix, vagina and perineum inspected. Second degree laceration was noted, repaired using 3-0 chromic in the usual fashion with good hemostasis.     Viable male infant delivered, weighing 3540 gram, with APGARs 9/9    Lacteration: 2nd degree laceration  EBL 300cc

## 2020-08-12 NOTE — DISCHARGE NOTE OB - CARE PLAN
Principal Discharge DX:	Vaginal delivery  Goal:	vitals and labs  Assessment and plan of treatment:	healthy mom and baby Principal Discharge DX:	Vaginal delivery  Goal:	vitals and labs  Assessment and plan of treatment:	healthy mom

## 2020-08-12 NOTE — OB PROVIDER H&P - NSHPLABSRESULTS_GEN_ALL_CORE
Labs:  1/29/2020  HbsAg NR  RPR NR  Measles immune  Varicella immune  Rubella immune  mumps immune  B positive  Antibody neg   HIV NR   GCT 82    7/20/2020  GBS neg  RPR NR  HIV NR    Sonograms:  EGA 24.1, AGA 42%, AFV Nl, ANT Placenta, 3vcord, cvx 5cm, small placental lakes, nl anatomy

## 2020-08-12 NOTE — OB PROVIDER H&P - NS_OBGYNHISTORY_OBGYN_ALL_OB_FT
OBHx:  ) FT , 6.12, postpartum preeclampsia, given Mg  G2) No complications during this pregnancy, BPs wnl, taking ASA for h/o preeclampsia (last dose <36w)    GYN:  Denies h/o cysts, fibroids, STIs, abnormal pap smears.

## 2020-08-13 ENCOUNTER — TRANSCRIPTION ENCOUNTER (OUTPATIENT)
Age: 20
End: 2020-08-13

## 2020-08-13 VITALS
HEART RATE: 73 BPM | TEMPERATURE: 97 F | DIASTOLIC BLOOD PRESSURE: 64 MMHG | RESPIRATION RATE: 18 BRPM | SYSTOLIC BLOOD PRESSURE: 121 MMHG

## 2020-08-13 LAB
BASOPHILS # BLD AUTO: 0.05 K/UL — SIGNIFICANT CHANGE UP (ref 0–0.2)
BASOPHILS NFR BLD AUTO: 0.6 % — SIGNIFICANT CHANGE UP (ref 0–1)
EOSINOPHIL # BLD AUTO: 0.11 K/UL — SIGNIFICANT CHANGE UP (ref 0–0.7)
EOSINOPHIL NFR BLD AUTO: 1.3 % — SIGNIFICANT CHANGE UP (ref 0–8)
HCT VFR BLD CALC: 39.7 % — SIGNIFICANT CHANGE UP (ref 37–47)
HGB BLD-MCNC: 13.6 G/DL — SIGNIFICANT CHANGE UP (ref 12–16)
IMM GRANULOCYTES NFR BLD AUTO: 0.2 % — SIGNIFICANT CHANGE UP (ref 0.1–0.3)
LYMPHOCYTES # BLD AUTO: 2.07 K/UL — SIGNIFICANT CHANGE UP (ref 1.2–3.4)
LYMPHOCYTES # BLD AUTO: 24.5 % — SIGNIFICANT CHANGE UP (ref 20.5–51.1)
MCHC RBC-ENTMCNC: 29.6 PG — SIGNIFICANT CHANGE UP (ref 27–31)
MCHC RBC-ENTMCNC: 34.3 G/DL — SIGNIFICANT CHANGE UP (ref 32–37)
MCV RBC AUTO: 86.5 FL — SIGNIFICANT CHANGE UP (ref 81–99)
MONOCYTES # BLD AUTO: 0.74 K/UL — HIGH (ref 0.1–0.6)
MONOCYTES NFR BLD AUTO: 8.8 % — SIGNIFICANT CHANGE UP (ref 1.7–9.3)
NEUTROPHILS # BLD AUTO: 5.45 K/UL — SIGNIFICANT CHANGE UP (ref 1.4–6.5)
NEUTROPHILS NFR BLD AUTO: 64.6 % — SIGNIFICANT CHANGE UP (ref 42.2–75.2)
NRBC # BLD: 0 /100 WBCS — SIGNIFICANT CHANGE UP (ref 0–0)
PLATELET # BLD AUTO: 160 K/UL — SIGNIFICANT CHANGE UP (ref 130–400)
RBC # BLD: 4.59 M/UL — SIGNIFICANT CHANGE UP (ref 4.2–5.4)
RBC # FLD: 14 % — SIGNIFICANT CHANGE UP (ref 11.5–14.5)
WBC # BLD: 8.44 K/UL — SIGNIFICANT CHANGE UP (ref 4.8–10.8)
WBC # FLD AUTO: 8.44 K/UL — SIGNIFICANT CHANGE UP (ref 4.8–10.8)

## 2020-08-13 PROCEDURE — 99231 SBSQ HOSP IP/OBS SF/LOW 25: CPT

## 2020-08-13 RX ADMIN — SODIUM CHLORIDE 3 MILLILITER(S): 9 INJECTION INTRAMUSCULAR; INTRAVENOUS; SUBCUTANEOUS at 16:33

## 2020-08-13 RX ADMIN — Medication 1 TABLET(S): at 12:57

## 2020-08-13 RX ADMIN — Medication 600 MILLIGRAM(S): at 18:11

## 2020-08-13 RX ADMIN — Medication 600 MILLIGRAM(S): at 12:58

## 2020-08-13 RX ADMIN — Medication 600 MILLIGRAM(S): at 01:25

## 2020-08-13 RX ADMIN — Medication 600 MILLIGRAM(S): at 00:53

## 2020-08-13 RX ADMIN — Medication 600 MILLIGRAM(S): at 18:10

## 2020-08-13 RX ADMIN — Medication 600 MILLIGRAM(S): at 06:51

## 2020-08-13 RX ADMIN — Medication 600 MILLIGRAM(S): at 12:57

## 2020-08-13 NOTE — DISCHARGE NOTE OB - HOSPITAL COURSE
DATE OF DISCHARGE: 20 @ 14:17    HISTORY OF PRESENT ILLNESS/HOSPITAL COURSE: HPI:  21yo  @40w1d by 2nd trimester monet, SIENNA 20, sent to L&D from clinic as patient was 5cm dilated. Contractions started at 0700 today, irregular frequency, 8/10 in severity. Denies VB and LOF. Reports good FM. Denies complications during pregnancy. Has been on ASA for h/o preeclampsia in prior pregnancy. Last dose of ASA before 36weeks as per patient. GBS negative. (12 Aug 2020 14:44)    PAST MEDICAL & SURGICAL HISTORY:  No pertinent past medical history  No significant past surgical history      PROCEDURES PERFORMED:     COMPLICATIONS:   none    POST PARTUM COURSE:  unremarkable      FINAL DIAGNOSIS:  Term pregnancy delivered    LABS:                       13.6   8.44  )-----------( 160      ( 13 Aug 2020 07:28 )             39.7
DATE OF DISCHARGE: 20 @ 22:08    HISTORY OF PRESENT ILLNESS/HOSPITAL COURSE: HPI:  19yo  @40w1d by 2nd trimester monet, SIENNA 20, sent to L&D from clinic as patient was 5cm dilated. Contractions started at 0700 today, irregular frequency, 8/10 in severity. Denies VB and LOF. Reports good FM. Denies complications during pregnancy. Has been on ASA for h/o preeclampsia in prior pregnancy. Last dose of ASA before 36weeks as per patient. GBS negative. (12 Aug 2020 14:44)    PAST MEDICAL & SURGICAL HISTORY:  No pertinent past medical history  No significant past surgical history      PROCEDURES PERFORMED: Term spontaneous vaginal delivery  POST PARTUM COURSE: uncomplicated, discharged home on PPD2  FINAL DIAGNOSIS:  Status post normal spontaneous vaginal delivery at Gestational Age    DISCHARGE CBC:                       14.7   9.86  )-----------( 187      ( 12 Aug 2020 14:02 )             42.6     DISCHARGE INSTRUCTIONS:  If you have severe abdominal pain, heavy vaginal bleeding, shortness of breath or chest pain please call your doctor or come to the emergency room.   DIET:  Advance as tolerated.  ACTIVITY:  Advance as tolerated.  Pelvic rest for 6 weeks.  Nothing to be inserted into the vagina for 6 weeks, i.e. no tampons, douching, sexual relations, or tub baths.   FOLLOW UP: Make an appointment to see your doctor as instructed

## 2020-08-13 NOTE — DISCHARGE NOTE OB - MEDICATION SUMMARY - MEDICATIONS TO TAKE
I will START or STAY ON the medications listed below when I get home from the hospital:    acetaminophen 325 mg oral tablet  -- 3 tab(s) by mouth every 6 hours, As Needed  -- Indication: For pain    ibuprofen 600 mg oral tablet  -- 1 tab(s) by mouth every 6 hours, As Needed - for moderate pain  -- Indication: For pain
I will START or STAY ON the medications listed below when I get home from the hospital:    acetaminophen 325 mg oral tablet  -- 3 tab(s) by mouth every 6 hours, As Needed  -- Indication: For mild pain    ibuprofen 600 mg oral tablet  -- 1 tab(s) by mouth every 6 hours, As Needed - for moderate pain  -- Indication: For mild pain

## 2020-08-13 NOTE — DISCHARGE NOTE OB - PATIENT PORTAL LINK FT
You can access the FollowMyHealth Patient Portal offered by [facility] by registering at the following website: [email or web address]. By joining [employer]’s FollowMyHealth portal, you will also be able to view your health information using other applications (apps) compatible with our system.
You can access the FollowMyHealth Patient Portal offered by [facility] by registering at the following website: [email or web address]. By joining [employer]’s FollowMyHealth portal, you will also be able to view your health information using other applications (apps) compatible with our system.

## 2020-08-13 NOTE — PROGRESS NOTE ADULT - SUBJECTIVE AND OBJECTIVE BOX
OB attending  PPD #1    Pt doing well, pain well controlled. No overnight events, no acute complaints.    Ambulating: Yes  Voiding: Yes  Flatus: Yes  Bowel movements: Yes   Breast or bottle feeding: Breastfeeding  Diet: Regular    PAST MEDICAL & SURGICAL HISTORY:  No pertinent past medical history  No significant past surgical history      Physical Exam  Vital Signs Last 24 Hrs  T(C): 36.2 (13 Aug 2020 08:29), Max: 36.8 (12 Aug 2020 13:18)  T(F): 97.2 (13 Aug 2020 08:29), Max: 98.2 (12 Aug 2020 13:18)  HR: 71 (13 Aug 2020 08:29) (71 - 105)  BP: 101/55 (13 Aug 2020 08:29) (100/58 - 133/65)  BP(mean): --  RR: 18 (13 Aug 2020 08:29) (16 - 18)  SpO2: 100% (12 Aug 2020 17:42) (93% - 100%)  Gen: AAOx3, NAD  Abd: Soft, nontender, nondistended, BS+  Fundus: Firm, below umbilicus  Lochia: normal  Ext: No calf tenderness, no swelling    Labs:                        13.6   8.44  )-----------( 160      ( 13 Aug 2020 07:28 )             39.7         A/P: yo G P @ wks, s/p , PPD #1, doing well  - continue current management

## 2020-08-13 NOTE — DISCHARGE NOTE OB - CARE PLAN
Principal Discharge DX:	Vaginal delivery  Goal:	healthy mom/ healthy baby  Assessment and plan of treatment:	Discharge home today, Nothing per vagina x 6 wks (no sex, tampons or douching), Followup 5-6 wks for post-partum care, If you experience severe pain, heavy vaginal bleeding or fever call your doctor

## 2020-08-13 NOTE — DISCHARGE NOTE OB - PLAN OF CARE
healthy mom/ healthy baby Discharge home today, Nothing per vagina x 6 wks (no sex, tampons or douching), Followup 5-6 wks for post-partum care, If you experience severe pain, heavy vaginal bleeding or fever call your doctor

## 2020-08-13 NOTE — DISCHARGE NOTE OB - CARE PROVIDER_API CALL
Frank Araujo  OBSTETRICS AND GYNECOLOGY  [address] [place] [address]  Phone: [phone]  Fax: [phone]  Follow Up Time:
Roque Jaramillo  OBS-GYN - PHYSICIANS  [address] [place] [address]  Phone: [phone]  Fax: [phone]  Follow Up Time:

## 2020-08-14 LAB
SARS-COV-2 IGG SERPL QL IA: NEGATIVE — SIGNIFICANT CHANGE UP
SARS-COV-2 IGM SERPL IA-ACNC: <0.1 INDEX — SIGNIFICANT CHANGE UP

## 2020-08-17 DIAGNOSIS — O43.893 OTHER PLACENTAL DISORDERS, THIRD TRIMESTER: ICD-10-CM

## 2020-08-17 DIAGNOSIS — Z79.82 LONG TERM (CURRENT) USE OF ASPIRIN: ICD-10-CM

## 2020-08-17 DIAGNOSIS — Z3A.40 40 WEEKS GESTATION OF PREGNANCY: ICD-10-CM

## 2020-08-17 DIAGNOSIS — Z91.011 ALLERGY TO MILK PRODUCTS: ICD-10-CM

## 2021-03-15 NOTE — DISCHARGE NOTE OB - DO NOT DIET OR “STARVE” YOURSELF INTO GETTING BACK TO PRE-PREGNANCY SHAPE
PDMP Review:  Adderall XR 30 mg #30 last dispensed 2/12/21 for 30-day supply. Next refill due 3/13/21.  Adderall IR 30 mg #30 last dispensed 2/12/21 for 30-day supply. Next refill due 3/13/21.    Medication contract signed 7/21/20  [facility]  UDS: 7/21/20, compliant, no illicit drugs     Statement Selected

## 2021-10-26 ENCOUNTER — APPOINTMENT (OUTPATIENT)
Dept: ANTEPARTUM | Facility: CLINIC | Age: 21
End: 2021-10-26
Payer: MEDICAID

## 2021-10-26 ENCOUNTER — ASOB RESULT (OUTPATIENT)
Age: 21
End: 2021-10-26

## 2021-10-26 PROCEDURE — 76811 OB US DETAILED SNGL FETUS: CPT

## 2022-03-10 ENCOUNTER — INPATIENT (INPATIENT)
Facility: HOSPITAL | Age: 22
LOS: 2 days | Discharge: HOME | End: 2022-03-13
Attending: OBSTETRICS & GYNECOLOGY | Admitting: OBSTETRICS & GYNECOLOGY
Payer: MEDICAID

## 2022-03-10 VITALS — SYSTOLIC BLOOD PRESSURE: 120 MMHG | HEART RATE: 88 BPM | DIASTOLIC BLOOD PRESSURE: 66 MMHG

## 2022-03-10 LAB
BASOPHILS # BLD AUTO: 0.04 K/UL — SIGNIFICANT CHANGE UP (ref 0–0.2)
BASOPHILS # BLD AUTO: 0.05 K/UL — SIGNIFICANT CHANGE UP (ref 0–0.2)
BASOPHILS NFR BLD AUTO: 0.3 % — SIGNIFICANT CHANGE UP (ref 0–1)
BASOPHILS NFR BLD AUTO: 0.4 % — SIGNIFICANT CHANGE UP (ref 0–1)
BLD GP AB SCN SERPL QL: SIGNIFICANT CHANGE UP
EOSINOPHIL # BLD AUTO: 0.06 K/UL — SIGNIFICANT CHANGE UP (ref 0–0.7)
EOSINOPHIL # BLD AUTO: 0.08 K/UL — SIGNIFICANT CHANGE UP (ref 0–0.7)
EOSINOPHIL NFR BLD AUTO: 0.5 % — SIGNIFICANT CHANGE UP (ref 0–8)
EOSINOPHIL NFR BLD AUTO: 0.7 % — SIGNIFICANT CHANGE UP (ref 0–8)
HCT VFR BLD CALC: 39.7 % — SIGNIFICANT CHANGE UP (ref 37–47)
HCT VFR BLD CALC: 41.9 % — SIGNIFICANT CHANGE UP (ref 37–47)
HGB BLD-MCNC: 13.4 G/DL — SIGNIFICANT CHANGE UP (ref 12–16)
HGB BLD-MCNC: 14 G/DL — SIGNIFICANT CHANGE UP (ref 12–16)
IMM GRANULOCYTES NFR BLD AUTO: 0.5 % — HIGH (ref 0.1–0.3)
IMM GRANULOCYTES NFR BLD AUTO: 0.5 % — HIGH (ref 0.1–0.3)
LYMPHOCYTES # BLD AUTO: 1.95 K/UL — SIGNIFICANT CHANGE UP (ref 1.2–3.4)
LYMPHOCYTES # BLD AUTO: 14.8 % — LOW (ref 20.5–51.1)
LYMPHOCYTES # BLD AUTO: 2.57 K/UL — SIGNIFICANT CHANGE UP (ref 1.2–3.4)
LYMPHOCYTES # BLD AUTO: 21.6 % — SIGNIFICANT CHANGE UP (ref 20.5–51.1)
MCHC RBC-ENTMCNC: 28.8 PG — SIGNIFICANT CHANGE UP (ref 27–31)
MCHC RBC-ENTMCNC: 28.8 PG — SIGNIFICANT CHANGE UP (ref 27–31)
MCHC RBC-ENTMCNC: 33.4 G/DL — SIGNIFICANT CHANGE UP (ref 32–37)
MCHC RBC-ENTMCNC: 33.8 G/DL — SIGNIFICANT CHANGE UP (ref 32–37)
MCV RBC AUTO: 85.4 FL — SIGNIFICANT CHANGE UP (ref 81–99)
MCV RBC AUTO: 86.2 FL — SIGNIFICANT CHANGE UP (ref 81–99)
MONOCYTES # BLD AUTO: 0.76 K/UL — HIGH (ref 0.1–0.6)
MONOCYTES # BLD AUTO: 0.77 K/UL — HIGH (ref 0.1–0.6)
MONOCYTES NFR BLD AUTO: 5.8 % — SIGNIFICANT CHANGE UP (ref 1.7–9.3)
MONOCYTES NFR BLD AUTO: 6.4 % — SIGNIFICANT CHANGE UP (ref 1.7–9.3)
NEUTROPHILS # BLD AUTO: 10.31 K/UL — HIGH (ref 1.4–6.5)
NEUTROPHILS # BLD AUTO: 8.38 K/UL — HIGH (ref 1.4–6.5)
NEUTROPHILS NFR BLD AUTO: 70.5 % — SIGNIFICANT CHANGE UP (ref 42.2–75.2)
NEUTROPHILS NFR BLD AUTO: 78 % — HIGH (ref 42.2–75.2)
NRBC # BLD: 0 /100 WBCS — SIGNIFICANT CHANGE UP (ref 0–0)
NRBC # BLD: 0 /100 WBCS — SIGNIFICANT CHANGE UP (ref 0–0)
PLATELET # BLD AUTO: 211 K/UL — SIGNIFICANT CHANGE UP (ref 130–400)
PLATELET # BLD AUTO: 213 K/UL — SIGNIFICANT CHANGE UP (ref 130–400)
PRENATAL SYPHILIS TEST: SIGNIFICANT CHANGE UP
RBC # BLD: 4.65 M/UL — SIGNIFICANT CHANGE UP (ref 4.2–5.4)
RBC # BLD: 4.86 M/UL — SIGNIFICANT CHANGE UP (ref 4.2–5.4)
RBC # FLD: 14 % — SIGNIFICANT CHANGE UP (ref 11.5–14.5)
RBC # FLD: 14 % — SIGNIFICANT CHANGE UP (ref 11.5–14.5)
SARS-COV-2 RNA SPEC QL NAA+PROBE: SIGNIFICANT CHANGE UP
WBC # BLD: 11.89 K/UL — HIGH (ref 4.8–10.8)
WBC # BLD: 13.2 K/UL — HIGH (ref 4.8–10.8)
WBC # FLD AUTO: 11.89 K/UL — HIGH (ref 4.8–10.8)
WBC # FLD AUTO: 13.2 K/UL — HIGH (ref 4.8–10.8)

## 2022-03-10 PROCEDURE — 59409 OBSTETRICAL CARE: CPT | Mod: U9

## 2022-03-10 RX ORDER — LANOLIN
1 OINTMENT (GRAM) TOPICAL EVERY 6 HOURS
Refills: 0 | Status: DISCONTINUED | OUTPATIENT
Start: 2022-03-10 | End: 2022-03-13

## 2022-03-10 RX ORDER — OXYTOCIN 10 UNIT/ML
333.33 VIAL (ML) INJECTION
Qty: 20 | Refills: 0 | Status: DISCONTINUED | OUTPATIENT
Start: 2022-03-10 | End: 2022-03-13

## 2022-03-10 RX ORDER — MAGNESIUM HYDROXIDE 400 MG/1
30 TABLET, CHEWABLE ORAL
Refills: 0 | Status: DISCONTINUED | OUTPATIENT
Start: 2022-03-10 | End: 2022-03-13

## 2022-03-10 RX ORDER — INFLUENZA VIRUS VACCINE 15; 15; 15; 15 UG/.5ML; UG/.5ML; UG/.5ML; UG/.5ML
0.5 SUSPENSION INTRAMUSCULAR ONCE
Refills: 0 | Status: COMPLETED | OUTPATIENT
Start: 2022-03-10 | End: 2022-03-10

## 2022-03-10 RX ORDER — PRAMOXINE HYDROCHLORIDE 150 MG/15G
1 AEROSOL, FOAM RECTAL EVERY 4 HOURS
Refills: 0 | Status: DISCONTINUED | OUTPATIENT
Start: 2022-03-10 | End: 2022-03-13

## 2022-03-10 RX ORDER — DIBUCAINE 1 %
1 OINTMENT (GRAM) RECTAL EVERY 6 HOURS
Refills: 0 | Status: DISCONTINUED | OUTPATIENT
Start: 2022-03-10 | End: 2022-03-13

## 2022-03-10 RX ORDER — OXYCODONE HYDROCHLORIDE 5 MG/1
5 TABLET ORAL
Refills: 0 | Status: DISCONTINUED | OUTPATIENT
Start: 2022-03-10 | End: 2022-03-13

## 2022-03-10 RX ORDER — HYDROCORTISONE 1 %
1 OINTMENT (GRAM) TOPICAL EVERY 6 HOURS
Refills: 0 | Status: DISCONTINUED | OUTPATIENT
Start: 2022-03-10 | End: 2022-03-13

## 2022-03-10 RX ORDER — BENZOCAINE 10 %
1 GEL (GRAM) MUCOUS MEMBRANE EVERY 6 HOURS
Refills: 0 | Status: DISCONTINUED | OUTPATIENT
Start: 2022-03-10 | End: 2022-03-13

## 2022-03-10 RX ORDER — AER TRAVELER 0.5 G/1
1 SOLUTION RECTAL; TOPICAL EVERY 4 HOURS
Refills: 0 | Status: DISCONTINUED | OUTPATIENT
Start: 2022-03-10 | End: 2022-03-13

## 2022-03-10 RX ORDER — KETOROLAC TROMETHAMINE 30 MG/ML
30 SYRINGE (ML) INJECTION ONCE
Refills: 0 | Status: DISCONTINUED | OUTPATIENT
Start: 2022-03-10 | End: 2022-03-10

## 2022-03-10 RX ORDER — TETANUS TOXOID, REDUCED DIPHTHERIA TOXOID AND ACELLULAR PERTUSSIS VACCINE, ADSORBED 5; 2.5; 8; 8; 2.5 [IU]/.5ML; [IU]/.5ML; UG/.5ML; UG/.5ML; UG/.5ML
0.5 SUSPENSION INTRAMUSCULAR ONCE
Refills: 0 | Status: DISCONTINUED | OUTPATIENT
Start: 2022-03-10 | End: 2022-03-13

## 2022-03-10 RX ORDER — SODIUM CHLORIDE 9 MG/ML
3 INJECTION INTRAMUSCULAR; INTRAVENOUS; SUBCUTANEOUS EVERY 8 HOURS
Refills: 0 | Status: DISCONTINUED | OUTPATIENT
Start: 2022-03-10 | End: 2022-03-13

## 2022-03-10 RX ORDER — OXYCODONE HYDROCHLORIDE 5 MG/1
5 TABLET ORAL ONCE
Refills: 0 | Status: DISCONTINUED | OUTPATIENT
Start: 2022-03-10 | End: 2022-03-13

## 2022-03-10 RX ORDER — IBUPROFEN 200 MG
600 TABLET ORAL EVERY 6 HOURS
Refills: 0 | Status: COMPLETED | OUTPATIENT
Start: 2022-03-10 | End: 2023-02-06

## 2022-03-10 RX ORDER — ACETAMINOPHEN 500 MG
975 TABLET ORAL
Refills: 0 | Status: DISCONTINUED | OUTPATIENT
Start: 2022-03-10 | End: 2022-03-13

## 2022-03-10 RX ORDER — OXYTOCIN 10 UNIT/ML
333.33 VIAL (ML) INJECTION
Qty: 20 | Refills: 0 | Status: DISCONTINUED | OUTPATIENT
Start: 2022-03-10 | End: 2022-03-10

## 2022-03-10 RX ORDER — DIPHENHYDRAMINE HCL 50 MG
25 CAPSULE ORAL EVERY 6 HOURS
Refills: 0 | Status: DISCONTINUED | OUTPATIENT
Start: 2022-03-10 | End: 2022-03-13

## 2022-03-10 RX ORDER — SODIUM CHLORIDE 9 MG/ML
1000 INJECTION, SOLUTION INTRAVENOUS
Refills: 0 | Status: DISCONTINUED | OUTPATIENT
Start: 2022-03-10 | End: 2022-03-10

## 2022-03-10 RX ORDER — SIMETHICONE 80 MG/1
80 TABLET, CHEWABLE ORAL EVERY 4 HOURS
Refills: 0 | Status: DISCONTINUED | OUTPATIENT
Start: 2022-03-10 | End: 2022-03-13

## 2022-03-10 RX ORDER — IBUPROFEN 200 MG
600 TABLET ORAL EVERY 6 HOURS
Refills: 0 | Status: DISCONTINUED | OUTPATIENT
Start: 2022-03-10 | End: 2022-03-13

## 2022-03-10 RX ADMIN — Medication 600 MILLIGRAM(S): at 14:23

## 2022-03-10 RX ADMIN — Medication 1000 MILLIUNIT(S)/MIN: at 03:43

## 2022-03-10 RX ADMIN — SODIUM CHLORIDE 125 MILLILITER(S): 9 INJECTION, SOLUTION INTRAVENOUS at 03:43

## 2022-03-10 RX ADMIN — Medication 30 MILLIGRAM(S): at 03:30

## 2022-03-10 RX ADMIN — SODIUM CHLORIDE 3 MILLILITER(S): 9 INJECTION INTRAMUSCULAR; INTRAVENOUS; SUBCUTANEOUS at 05:54

## 2022-03-10 RX ADMIN — Medication 600 MILLIGRAM(S): at 15:14

## 2022-03-10 RX ADMIN — Medication 30 MILLIGRAM(S): at 04:35

## 2022-03-10 RX ADMIN — SODIUM CHLORIDE 3 MILLILITER(S): 9 INJECTION INTRAMUSCULAR; INTRAVENOUS; SUBCUTANEOUS at 14:19

## 2022-03-10 RX ADMIN — Medication 1 SPRAY(S): at 08:13

## 2022-03-10 RX ADMIN — Medication 1 APPLICATION(S): at 08:13

## 2022-03-10 NOTE — OB PROVIDER H&P - NSHPLABSRESULTS_GEN_ALL_CORE
Mumps: Immune    SONOS: Mumps: Immune    SONOS:  21w3d: EFW 15oz, CL 3.86, posterior placenta, variable, no fetal malformations noted.

## 2022-03-10 NOTE — OB PROVIDER H&P - NS_OBGYNHISTORY_OBGYN_ALL_OB_FT
Obhx: NSVDx2, largest 7-12, G1: preeclampsia with severe features on magnesium  Gynhx: Denies cysts, fibroids, abnormal paps or STDs

## 2022-03-10 NOTE — OB PROVIDER DELIVERY SUMMARY - NSSELHIDDEN_OBGYN_ALL_OB_FT
[NS_DeliveryAttending1_OBGYN_ALL_OB_FT:[email or web address]=],[NS_DeliveryAssist1_OBGYN_ALL_OB_FT:[email or web address]=],[NS_DeliveryRN_OBGYN_ALL_OB_FT:[email or web address]=]

## 2022-03-10 NOTE — OB RN DELIVERY SUMMARY - NSSELHIDDEN_OBGYN_ALL_OB_FT
[NS_DeliveryAttending1_OBGYN_ALL_OB_FT:[email or web address]=],[NS_DeliveryAssist1_OBGYN_ALL_OB_FT:[id number]=],[NS_DeliveryRN_OBGYN_ALL_OB_FT:[email or web address]=]

## 2022-03-10 NOTE — OB PROVIDER H&P - HISTORY OF PRESENT ILLNESS
23y/o  at 40w5d dated by first trimester sonogram with SIENNA 3/5/22, presents for contractions since 9pm increasing in frequency and duration.   Denies vaginal bleeding and LOF. Feels good fetal movements. Last examined yesterday and was found to be 1cm dilated. Was on aspirin this pregnancy for prior hx of preeclampsia with severe features after delivery of first baby. No other complications this pregnancy. GBS negative.

## 2022-03-10 NOTE — OB PROVIDER DELIVERY SUMMARY - NSPROVIDERDELIVERYNOTE_OBGYN_ALL_OB_FT
Patient was fully dilated and pushed. NSD live female, Apgars 9/9 . Vtx delivered OA, nuchal cord  x2 noted, Fetal head restituted to LOT. The anterior and posterior shoulders delivered, followed by the remaining body atraumatically. Delayed cord clamping was performed, and then clamped and cut. Cord blood & gases collected. The  was handed to mother for skin to skin. The placenta delivered spontaneously intact with 3v cord. Uterus massaged and firm with oxytocin given IV. Cervix, vagina and perineum inspected . Repair of a small first degree laceration in the usual fashion with 3-0 chromic.   EBL -300cc, hemostasis assured.

## 2022-03-10 NOTE — OB PROVIDER H&P - NSHPPHYSICALEXAM_GEN_ALL_CORE
Gen: AOx3, no acute distress  Abd: soft, gravid, non tender, mildly palpable contractions  Ext: No edema bilaterally    EFM:  due to precipitous delivery, continuous toco unable to be obtained  [place]: see above  SVE: 7/90/-1   vertex intact

## 2022-03-10 NOTE — OB PROVIDER H&P - ASSESSMENT
21yo  @ 40w5ds, GBS neg, on aspirin for prophylaxis, in labor.    Admit to L & D  IV hydration, labs  Continuous EFM & TOCO monitoring  Clear Liquid diet  Pain Management PRN    Dr. Zuniga and Dr. Capone aware.

## 2022-03-10 NOTE — OB RN PATIENT PROFILE - FALL HARM RISK - UNIVERSAL INTERVENTIONS
Bed in lowest position, wheels locked, appropriate side rails in place/Call bell, personal items and telephone in reach/Instruct patient to call for assistance before getting out of bed or chair/Non-slip footwear when patient is out of bed/[place] to call system/Physically safe environment - no spills, clutter or unnecessary equipment/Purposeful Proactive Rounding/Room/bathroom lighting operational, light cord in reach

## 2022-03-11 RX ADMIN — Medication 600 MILLIGRAM(S): at 23:23

## 2022-03-11 RX ADMIN — Medication 600 MILLIGRAM(S): at 06:06

## 2022-03-11 RX ADMIN — Medication 600 MILLIGRAM(S): at 18:10

## 2022-03-12 ENCOUNTER — TRANSCRIPTION ENCOUNTER (OUTPATIENT)
Age: 22
End: 2022-03-12

## 2022-03-12 VITALS
HEART RATE: 68 BPM | SYSTOLIC BLOOD PRESSURE: 103 MMHG | TEMPERATURE: 97 F | DIASTOLIC BLOOD PRESSURE: 63 MMHG | RESPIRATION RATE: 18 BRPM

## 2022-03-12 RX ORDER — IBUPROFEN 200 MG
1 TABLET ORAL
Qty: 0 | Refills: 0 | DISCHARGE
Start: 2022-03-12

## 2022-03-12 RX ORDER — ACETAMINOPHEN 500 MG
3 TABLET ORAL
Qty: 0 | Refills: 0 | DISCHARGE
Start: 2022-03-12

## 2022-03-12 RX ADMIN — Medication 600 MILLIGRAM(S): at 14:51

## 2022-03-12 RX ADMIN — Medication 600 MILLIGRAM(S): at 00:18

## 2022-03-12 NOTE — DISCHARGE NOTE OB - CARE PLAN
1 Principal Discharge DX:	Vaginal delivery  Assessment and plan of treatment:	if you have a fever greater than 100.4 degrees fahrenheit, 10/10 pain, or are soaking more than 2 pads in one hour, call your doctor or come to the hospital. Follow up in 6 week for postpartum visit.

## 2022-03-12 NOTE — DISCHARGE NOTE OB - NS MD DC FALL RISK RISK
For information on Fall & Injury Prevention, visit: [email or web address] OR  [email or web address] OR  https://www.cdc.gov/steadi/patient.html

## 2022-03-12 NOTE — DISCHARGE NOTE OB - CARE PROVIDER_API CALL
Carlos Eduardo Heard)  Obstetrics and Gynecology  [address] [place] [address]  Phone: [phone]  Fax: [phone]  Follow Up Time:

## 2022-03-12 NOTE — DISCHARGE NOTE OB - PATIENT PORTAL LINK FT
Attempt to reach patient for follow up. Discreet VM left with contact information. Per chart review, MADISON Schuler for [facility] has been working with patient to assist with financial resources. Will attempt another outreach in two weeks, 12/13. You can access the FollowMyHealth Patient Portal offered by [facility] by registering at the following website: [email or web address]. By joining [employer]’s FollowMyHealth portal, you will also be able to view your health information using other applications (apps) compatible with our system.

## 2022-03-12 NOTE — PROGRESS NOTE ADULT - SUBJECTIVE AND OBJECTIVE BOX
Subjective:   Patient doing well. No complaints. Minimal lochia. Pain controlled.    Objective:   T(F): 97 (03-12 @ 08:02), Max: 97 (03-12 @ 08:02)  HR: 68 (03-12 @ 08:02)  BP: 103/63 (03-12 @ 08:02) (103/63 - 125/70)  RR: 18 (03-12 @ 08:02)  SpO2: --  Gen: AAOx3, NAD  Abd: Soft, Nontender, Nondistended, Fundus firm below the umbilicus  Ext: no tender, mild edema  Min Lochia Rubra    Labs:                        13.4   11.89 )-----------( 211      ( 10 Mar 2022 16:00 )             39.7             Tolerating regular diet  Passed flatus, passed bowel movement  Breast/Bottle feeding    Assessment:   22y s/p , PPD#1, doing well    Plan:  -Routine postpartum care  -Encouraged ambulation and PO hydration  -Tolerating regular diet Subjective:   Patient doing well. No complaints. Minimal lochia. Pain controlled.    Objective:   T(F): 97 (03-12 @ 08:02), Max: 97 (03-12 @ 08:02)  HR: 68 (03-12 @ 08:02)  BP: 103/63 (03-12 @ 08:02) (103/63 - 125/70)  RR: 18 (03-12 @ 08:02)  SpO2: --  Gen: AAOx3, NAD  Abd: Soft, Nontender, Nondistended, Fundus firm below the umbilicus  Ext: no tender, mild edema  Min Lochia Rubra    Labs:                        13.4   11.89 )-----------( 211      ( 10 Mar 2022 16:00 )             39.7             Tolerating regular diet  Passed flatus, passed bowel movement  Breast/Bottle feeding    Assessment:   22y s/p , PPD#2, doing well    Plan:  -Routine postpartum care  -Encouraged ambulation and PO hydration  -Tolerating regular diet

## 2022-03-12 NOTE — DISCHARGE NOTE OB - HOSPITAL COURSE
s/p  with uncomplicated hospital course. Vitals and labs stable for discharge. Postpartum milestones met.

## 2022-03-15 DIAGNOSIS — O42.92 FULL-TERM PREMATURE RUPTURE OF MEMBRANES, UNSPECIFIED AS TO LENGTH OF TIME BETWEEN RUPTURE AND ONSET OF LABOR: ICD-10-CM

## 2022-03-15 DIAGNOSIS — Z3A.40 40 WEEKS GESTATION OF PREGNANCY: ICD-10-CM

## 2022-10-11 NOTE — OB RN TRIAGE NOTE - NS_VISITREASON1_OBGYN_ALL_OB
How Severe Are They?: moderate Is This A New Presentation, Or A Follow-Up?: Follow Up Keloids Additional History: Keloids on bilateral earlobes pt has had previous surgery 2021 pt states now keloids are back and are painful, and itchy Labor at Term

## 2023-09-29 PROBLEM — Z78.9 OTHER SPECIFIED HEALTH STATUS: Chronic | Status: ACTIVE | Noted: 2019-05-17

## 2023-10-03 NOTE — OB RN PATIENT PROFILE - 'COMMUNITY AGENCIES/SUPPORT GROUPS, OB PROFILE
Follow up with your primary care doctor, your pulmonologist and your oncologist in 1-2 days    Our Emergency Department Referral Coordinators will be reaching out to you in the next 24-48 hours from 9:00am to 5:00pm with a follow up appointment. Please expect a phone call from the hospital in that time frame. If you do not receive a call or if you have any questions or concerns, you can reach them at [phone]       Acute Cough    WHAT YOU NEED TO KNOW:    An acute cough can last up to 3 weeks. Common causes of an acute cough include a cold, allergies, or a lung infection.     DISCHARGE INSTRUCTIONS:    Return to the emergency department if:     You have trouble breathing or feel short of breath.      You cough up blood, or you see blood in your mucus.       You faint or feel weak or dizzy.       You have chest pain when you cough or take a deep breath.       You have new wheezing.     Contact your healthcare provider if:     You have a fever.       Your cough lasts longer than 4 weeks.       Your symptoms do not improve with treatment.       You have questions or concerns about your condition or care.     Medicines:     Medicines may be needed to stop the cough, decrease swelling in your airways, or help open your airways. Medicine may also be given to help you cough up mucus. Ask your healthcare provider what over-the-counter medicines you can take. If you have an infection caused by bacteria, you may need antibiotics.       Take your medicine as directed. Contact your healthcare provider if you think your medicine is not helping or if you have side effects. Tell him or her if you are allergic to any medicine. Keep a list of the medicines, vitamins, and herbs you take. Include the amounts, and when and why you take them. Bring the list or the pill bottles to follow-up visits. Carry your medicine list with you in case of an emergency.    Manage your symptoms:     Do not smoke and stay away from others who smoke. Nicotine and other chemicals in cigarettes and cigars can cause lung damage and make your cough worse. Ask your healthcare provider for information if you currently smoke and need help to quit. E-cigarettes or smokeless tobacco still contain nicotine. Talk to your healthcare provider before you use these products.       Drink extra liquids as directed. Liquids will help thin and loosen mucus so you can cough it up. Liquids will also help prevent dehydration. Examples of good liquids to drink include water, fruit juice, and broth. Do not drink liquids that contain caffeine. Caffeine can increase your risk for dehydration. Ask your healthcare provider how much liquid to drink each day.       Rest as directed. Do not do activities that make your cough worse, such as exercise.       Use a humidifier or vaporizer. Use a cool mist humidifier or a vaporizer to increase air moisture in your home. This may make it easier for you to breathe and help decrease your cough.       Eat 2 to 5 mL of honey 2 times each day. Honey can help thin mucus and decrease your cough.       Use cough drops or lozenges. These can help decrease throat irritation and your cough.     Follow up with your healthcare provider as directed: Write down your questions so you remember to ask them during your visits.        © Copyright [employer] 2019 All illustrations and images included in CareNotes are the copyrighted property of [employer] or [employer].    Pulmonary Nodules    WHAT YOU NEED TO KNOW:    Pulmonary nodules are areas of abnormal tissue in your lungs. You may not have any symptoms, or you may have chest tightness, a cough, chest pain, or shortness of breath. Nodules are usually found with an x-ray or CT scan. Most nodules are not cancerous. However, it is still important for you to return for follow-up testing to monitor your condition.     DISCHARGE INSTRUCTIONS:    Call 911 for any of the following:     You have severe shortness of breath or trouble breathing.       Your lips or nails look blue or pale.    Return to the emergency department if:     You cough up blood.      You suddenly feel lightheaded or are short of breath.      You have chest pain when you take a deep breath or cough.      You cannot think clearly.    Contact your healthcare provider if:     Your symptoms do not improve.      You have new symptoms.       You have questions or concerns about your condition or care.    Follow up with your healthcare provider: Your healthcare provider will refer you to a pulmonologist. Your pulmonologist will monitor your nodules for any change or growth. Nodules that grow quickly may require a biopsy to check for cancer. You may need to be monitored for 1 to 3 years. Write down your questions so you remember to ask them during your visits.     Do not smoke: Nicotine and other chemicals in cigarettes and cigars can cause lung damage or cancer. Stay away from others who smoke. Ask your healthcare provider for information if you or someone close to you currently smokes and needs help to quit. E-cigarettes or smokeless tobacco still contain nicotine. Talk to your healthcare provider before you use these products.        © Copyright [employer] 2019 All illustrations and images included in CareNotes are the copyrighted property of [employer] or [employer]. N/A

## 2023-10-12 ENCOUNTER — LABORATORY RESULT (OUTPATIENT)
Age: 23
End: 2023-10-12

## 2023-10-12 ENCOUNTER — APPOINTMENT (OUTPATIENT)
Dept: OBGYN | Facility: CLINIC | Age: 23
End: 2023-10-12
Payer: MEDICAID

## 2023-10-12 VITALS
SYSTOLIC BLOOD PRESSURE: 127 MMHG | DIASTOLIC BLOOD PRESSURE: 76 MMHG | WEIGHT: 153 LBS | BODY MASS INDEX: 28.89 KG/M2 | HEIGHT: 61 IN

## 2023-10-12 DIAGNOSIS — Z01.411 ENCOUNTER FOR GYNECOLOGICAL EXAMINATION (GENERAL) (ROUTINE) WITH ABNORMAL FINDINGS: ICD-10-CM

## 2023-10-12 DIAGNOSIS — N91.1 SECONDARY AMENORRHEA: ICD-10-CM

## 2023-10-12 LAB
BILIRUB UR QL STRIP: NORMAL
GLUCOSE UR-MCNC: NORMAL
HCG UR QL: 0.2 EU/DL
HCG UR QL: POSITIVE
HGB UR QL STRIP.AUTO: NORMAL
KETONES UR-MCNC: NORMAL
LEUKOCYTE ESTERASE UR QL STRIP: NORMAL
NITRITE UR QL STRIP: NORMAL
PH UR STRIP: 7.5
PROT UR STRIP-MCNC: NORMAL
SP GR UR STRIP: 1.01

## 2023-10-12 PROCEDURE — 99395 PREV VISIT EST AGE 18-39: CPT | Mod: 25

## 2023-10-12 PROCEDURE — 81003 URINALYSIS AUTO W/O SCOPE: CPT | Mod: QW

## 2023-10-12 PROCEDURE — 76817 TRANSVAGINAL US OBSTETRIC: CPT

## 2023-10-12 PROCEDURE — 99213 OFFICE O/P EST LOW 20 MIN: CPT | Mod: TH,25

## 2023-10-12 PROCEDURE — 81025 URINE PREGNANCY TEST: CPT

## 2023-10-12 RX ORDER — MULTI-VITAMIN/MINERAL SUPPLEMENT WITH SODIUM ASCORBATE, CHOLECALCIFEROL, DI-ALPHA-TOCOPHERYL ACETATE, THIAMINE MONONITRATE, RIBOFLAVIN, NIACINAMIDE, PYRIDOXINE HCL, FOLIC ACID, CYANOCOBALAMIN, CALCIUM FORMATE, CALCIUM CARBONATE, FERROUS (II) BIS-GLYCINATE CHELATE, POTASSIUM IODIDE, ZINC OXIDE, AND CHOLINE BITARTRATE 50; 155; 45; 32; 55; 30; 3; 1; 20; 10; 100; 10; 120; 3; 450 MG/1; MG/1; MG/1; MG/1; MG/1; [IU]/1; MG/1; MG/1; MG/1; UG/1; UG/1; MG/1; MG/1; MG/1; [IU]/1
32-1 TABLET, FILM COATED ORAL
Qty: 30 | Refills: 5 | Status: ACTIVE | COMMUNITY
Start: 2023-10-12 | End: 1900-01-01

## 2023-10-12 RX ORDER — ASPIRIN ENTERIC COATED TABLETS 81 MG 81 MG/1
81 TABLET, DELAYED RELEASE ORAL DAILY
Qty: 30 | Refills: 5 | Status: ACTIVE | COMMUNITY
Start: 2023-10-12 | End: 1900-01-01

## 2023-10-13 ENCOUNTER — APPOINTMENT (OUTPATIENT)
Dept: OBGYN | Facility: CLINIC | Age: 23
End: 2023-10-13

## 2023-10-16 LAB
ALBUMIN SERPL ELPH-MCNC: 4.3 G/DL
ALP BLD-CCNC: 48 U/L
ALT SERPL-CCNC: 11 U/L
ANION GAP SERPL CALC-SCNC: 11 MMOL/L
AST SERPL-CCNC: 12 U/L
BACTERIA UR CULT: NORMAL
BASOPHILS # BLD AUTO: 0.04 K/UL
BASOPHILS NFR BLD AUTO: 0.5 %
BILIRUB SERPL-MCNC: 0.3 MG/DL
BUN SERPL-MCNC: 9 MG/DL
C TRACH RRNA SPEC QL NAA+PROBE: NOT DETECTED
CALCIUM SERPL-MCNC: 9.3 MG/DL
CHLORIDE SERPL-SCNC: 105 MMOL/L
CO2 SERPL-SCNC: 21 MMOL/L
CREAT SERPL-MCNC: 0.48 MG/DL
EGFR: 135 ML/MIN/1.73M2
EOSINOPHIL # BLD AUTO: 0.14 K/UL
EOSINOPHIL NFR BLD AUTO: 1.8 %
FOLATE SERPL-MCNC: >20 NG/ML
GLUCOSE SERPL-MCNC: 92 MG/DL
HBV SURFACE AG SER QL: NONREACTIVE
HCT VFR BLD CALC: 39.8 %
HCV AB SER QL: NONREACTIVE
HCV S/CO RATIO: 0.19 S/CO
HGB BLD-MCNC: 13.4 G/DL
HIV1+2 AB SPEC QL IA.RAPID: NONREACTIVE
IMM GRANULOCYTES NFR BLD AUTO: 0.3 %
LEAD BLD-MCNC: <1 UG/DL
LYMPHOCYTES # BLD AUTO: 1.89 K/UL
LYMPHOCYTES NFR BLD AUTO: 24.8 %
MAN DIFF?: NORMAL
MCHC RBC-ENTMCNC: 28.7 PG
MCHC RBC-ENTMCNC: 33.7 GM/DL
MCV RBC AUTO: 85.2 FL
MEV IGG FLD QL IA: 147 AU/ML
MEV IGG+IGM SER-IMP: POSITIVE
MONOCYTES # BLD AUTO: 0.49 K/UL
MONOCYTES NFR BLD AUTO: 6.4 %
MUV AB SER-ACNC: POSITIVE
MUV IGG SER QL IA: 43.7 AU/ML
N GONORRHOEA RRNA SPEC QL NAA+PROBE: NOT DETECTED
NEUTROPHILS # BLD AUTO: 5.05 K/UL
NEUTROPHILS NFR BLD AUTO: 66.2 %
PLATELET # BLD AUTO: 246 K/UL
POTASSIUM SERPL-SCNC: 4.1 MMOL/L
PROT SERPL-MCNC: 6.6 G/DL
RBC # BLD: 4.67 M/UL
RBC # FLD: 13.2 %
RUBV IGG FLD-ACNC: 5.6 INDEX
RUBV IGG SER-IMP: POSITIVE
SODIUM SERPL-SCNC: 138 MMOL/L
SOURCE TP AMPLIFICATION: NORMAL
T PALLIDUM AB SER QL IA: NEGATIVE
T4 FREE SERPL-MCNC: 1.3 NG/DL
VIT B12 SERPL-MCNC: 373 PG/ML
VZV AB TITR SER: POSITIVE
VZV IGG SER IF-ACNC: 617.1 INDEX
WBC # FLD AUTO: 7.63 K/UL

## 2023-10-17 LAB — TSH SERPL-ACNC: 0.18 UIU/ML

## 2023-10-19 LAB
B19V IGG SER QL IA: 7.44 INDEX
B19V IGG+IGM SER-IMP: NORMAL
B19V IGG+IGM SER-IMP: POSITIVE
B19V IGM FLD-ACNC: 0.29 INDEX
B19V IGM SER-ACNC: NEGATIVE
CYTOLOGY CVX/VAG DOC THIN PREP: NORMAL

## 2023-11-16 ENCOUNTER — APPOINTMENT (OUTPATIENT)
Dept: OBGYN | Facility: CLINIC | Age: 23
End: 2023-11-16
Payer: MEDICAID

## 2023-11-16 VITALS — DIASTOLIC BLOOD PRESSURE: 73 MMHG | BODY MASS INDEX: 29.29 KG/M2 | WEIGHT: 155 LBS | SYSTOLIC BLOOD PRESSURE: 134 MMHG

## 2023-11-16 LAB
BILIRUB UR QL STRIP: NORMAL
GLUCOSE UR-MCNC: NORMAL
HCG UR QL: 0.2 EU/DL
HGB UR QL STRIP.AUTO: NORMAL
KETONES UR-MCNC: NORMAL
LEUKOCYTE ESTERASE UR QL STRIP: NORMAL
NITRITE UR QL STRIP: NORMAL
PH UR STRIP: 6.5
PROT UR STRIP-MCNC: NORMAL
SP GR UR STRIP: 1.01

## 2023-11-16 PROCEDURE — 81003 URINALYSIS AUTO W/O SCOPE: CPT | Mod: QW

## 2023-11-16 PROCEDURE — 99213 OFFICE O/P EST LOW 20 MIN: CPT | Mod: TH,25

## 2023-11-17 LAB
T4 FREE SERPL-MCNC: 1.2 NG/DL
TSH SERPL-ACNC: 0.45 UIU/ML

## 2023-12-21 ENCOUNTER — ASOB RESULT (OUTPATIENT)
Age: 23
End: 2023-12-21

## 2023-12-21 ENCOUNTER — APPOINTMENT (OUTPATIENT)
Dept: ANTEPARTUM | Facility: CLINIC | Age: 23
End: 2023-12-21
Payer: MEDICAID

## 2023-12-21 ENCOUNTER — APPOINTMENT (OUTPATIENT)
Dept: OBGYN | Facility: CLINIC | Age: 23
End: 2023-12-21
Payer: MEDICAID

## 2023-12-21 VITALS — WEIGHT: 159 LBS | DIASTOLIC BLOOD PRESSURE: 66 MMHG | BODY MASS INDEX: 30.04 KG/M2 | SYSTOLIC BLOOD PRESSURE: 103 MMHG

## 2023-12-21 LAB
BILIRUB UR QL STRIP: NORMAL
GLUCOSE UR-MCNC: NORMAL
HCG UR QL: 0.2 EU/DL
HGB UR QL STRIP.AUTO: NORMAL
KETONES UR-MCNC: NORMAL
LEUKOCYTE ESTERASE UR QL STRIP: NORMAL
NITRITE UR QL STRIP: NORMAL
PH UR STRIP: 6
PROT UR STRIP-MCNC: NORMAL
SP GR UR STRIP: 1.01

## 2023-12-21 PROCEDURE — 81003 URINALYSIS AUTO W/O SCOPE: CPT | Mod: QW

## 2023-12-21 PROCEDURE — 99213 OFFICE O/P EST LOW 20 MIN: CPT | Mod: TH,25

## 2023-12-21 PROCEDURE — 76811 OB US DETAILED SNGL FETUS: CPT

## 2023-12-21 NOTE — DISCHARGE NOTE OB - LAUNCH MEDICATION RECONCILIATION
Occupational Therapy    Evaluation/Treatment    Patient Name: Mary Marsh  MRN: [id number]  : 1958  Today's Date: 23  Time Calculation  Start Time: 845  Stop Time: 919  Time Calculation (min): 34 min       Assessment:  OT Assessment: Particia is a 66yo female presenting with decreased transfers, ADLs, bed mobility, functoinal activity and positioning. Pt requires skilled OT intervention to return to PLOF  Prognosis: Good  Barriers to Discharge: None  Evaluation/Treatment Tolerance: Other (Comment) (limited 2/2 anxiety)  Medical Staff Made Aware: Yes  End of Session Communication: Bedside nurse, PCT/NA/CTA  End of Session Patient Position: Bed, 2 rail up, Alarm on  OT Assessment Results: Decreased ADL status, Decreased endurance, Decreased safe judgment during ADL, Decreased sensation, Decreased functional mobility, Decreased IADLs, Decreased gross motor control  Prognosis: Good  Barriers to Discharge: None  Evaluation/Treatment Tolerance: Other (Comment) (limited 2/2 anxiety)  Medical Staff Made Aware: Yes  Strengths: Ability to acquire knowledge, Capable of completing ADLs semi/independent  Plan:  Treatment Interventions: ADL retraining, Functional transfer training, UE strengthening/ROM, Endurance training, Patient/family training, Compensatory technique education  OT Frequency: 3 times per week  OT Discharge Recommendations: Moderate intensity level of continued care  OT Recommended Transfer Status: Maximum assist  OT - OK to Discharge: Yes  Treatment Interventions: ADL retraining, Functional transfer training, UE strengthening/ROM, Endurance training, Patient/family training, Compensatory technique education    Subjective   Current Problem:  1. Post-traumatic arthritis of right ankle        2. Traumatic closed displaced fracture of shaft of tibia, unspecified laterality, initial encounter        3. Closed traumatic displaced fracture of shaft of right tibia, initial encounter           General:   OT Received On: 12/21/23  General  Reason for Referral: s/p R tibia IMN and tib/fib osteotomy for LLD  Past Medical History Relevant to Rehab: pt with h/o adrenal nodule, anemia, arthritis, CVA, COPD, depression, IMN for L hip fx.vision loss, urinary incontinenc, GERD, HTN  Family/Caregiver Present: No  Prior to Session Communication: Bedside nurse  Patient Position Received: Bed, 3 rail up, Alarm on  General Comment: Pt pleasant and willing to work with OT, pt reporting significant anxiety throughout session  Precautions:     Vital Signs:     Pain:  Pain Assessment  Pain Assessment: 0-10  Pain Score: 10 - Worst possible pain  Pain Type: Surgical pain  Pain Orientation: Right  Pain Interventions: Ambulation/increased activity, Repositioned (recent meds)    Objective   Cognition:  Overall Cognitive Status: Within Functional Limits  Orientation Level: Oriented X4           Home Living:  Type of Home: Mobile home  Lives With: Alone (has cat)  Home Adaptive Equipment: Walker rolling or standard, Wheelchair-manual  Home Layout: One level  Home Access: Ramped entrance  Bathroom Shower/Tub: Walk-in shower  Bathroom Toilet: Adaptive toilet seating  Bathroom Equipment: Grab bars in shower, Built-in shower seat, Raised toilet seat with rails  Bathroom Accessibility: reports too small for WC  Prior Function:  Level of [place]: Independent with ADLs and functional transfers  Receives Help From: Family  ADL Assistance: Independent  Homemaking Assistance: Needs assistance  Ambulatory Assistance: Needs assistance (uses wc, recently fell)  Prior Function Comments: Pt reports son orders groceries , pt uses a shop vacuum while in wc  IADL History:     ADL:  LE Dressing Assistance: Total  LE Dressing Deficit: Don/doff R sock, Don/doff L sock  Toileting Assistance with Device: Total  Toileting Deficit: Perineal hygiene  Activities of Daily Living: LE Dressing  LE Dressing: Yes  Sock Level of Assistance:  Dependent  LE Dressing Where Assessed: Bed level  LE Dressing Comments: requires TA for donning socks in bed, does not attempt 2/2 pain, able to assist slightly with lifting LE    Toileting  Toileting Level of Assistance: Dependent  Where Assessed: Bed level  Toileting Comments: requires  TA for francesca care in standing, soiled of BM  Activity Tolerance:  Endurance: Endurance does not limit participation in activity  Functional Standing Tolerance:     Bed Mobility/Transfers: Bed Mobility  Bed Mobility: Yes  Bed Mobility 1  Bed Mobility 1: Supine to sitting, Sitting to supine  Level of Assistance 1: Moderate assistance, Minimal verbal cues  Bed Mobility Comments 1: requires cues for sequencing and requires assist for LE and trunk    Transfers  Transfer: Yes  Transfer 1  Transfer From 1: Sit to, Stand to  Transfer to 1: Stand, Sit  Technique 1: Sit to stand, Stand to sit  Transfer Device 1: Walker  Transfer Level of Assistance 1: Moderate assistance    Modalities:     IADL's:   Splinting:     Casting:     Therapy/Activity: Therapeutic Activity  Therapeutic Activity Performed: Yes  Therapeutic Activity 1: Sitting EOB x5 minutes with SBA and therapeutic breathing activities. Pt demos abililty to mainstan stand x3 minutes with francesca care with Mod A    Vision:Vision - Basic Assessment  Current Vision: No visual deficits  Sensation:  Light Touch:  (numbness in R toes)    Coordination:  Coordination Comment: demos decreased coordination with tremors, could be 2/2 anxiety   Hand Function:     Extremities: RUE   RUE : Within Functional Limits and LUE   LUE: Within Functional Limits      Outcome Measures: [facility] Daily Activity  Putting on and taking off regular lower body clothing: A lot  Bathing (including washing, rinsing, drying): A lot  Putting on and taking off regular upper body clothing: A little  Toileting, which includes using toilet, bedpan or urinal: A lot  Taking care of personal grooming such as brushing teeth: A  little  Eating Meals: None  Daily Activity - Total Score: 16         and OT Adult Other Outcome Measures  4AT: 0, negative    Education Documentation  Body Mechanics, taught by Adriana Trinh OT at 12/21/2023 11:57 AM.  Learner: Patient  Readiness: Acceptance  Method: Explanation  Response: Needs Reinforcement    Precautions, taught by Adriana Trinh OT at 12/21/2023 11:57 AM.  Learner: Patient  Readiness: Acceptance  Method: Explanation  Response: Needs Reinforcement    ADL Training, taught by Adriana Trinh OT at 12/21/2023 11:57 AM.  Learner: Patient  Readiness: Acceptance  Method: Explanation  Response: Needs Reinforcement    Education Comments  No comments found.        OP EDUCATION:       Goals:  Encounter Problems       Encounter Problems (Active)       ADLs       Patient with complete upper body dressing with independent level of assistance donning and doffing all UE clothes with no adaptive equipment while edge of bed  (Progressing)       Start:  12/21/23    Expected End:  01/11/24            Patient with complete lower body dressing with moderate assist level of assistance donning and doffing all LE clothes  with PRN adaptive equipment while edge of bed  (Progressing)       Start:  12/21/23    Expected End:  01/11/24            Patient will complete toileting including hygiene clothing management/hygiene with minimal assist  level of assistance and raised toilet seat. (Progressing)       Start:  12/21/23    Expected End:  01/11/24               COGNITION/SAFETY       Pt will demo ability to complete therapeutic breathing and relaxation techniques prior to transfers with min verbal cues (Progressing)       Start:  12/21/23    Expected End:  01/11/24               EXERCISE/STRENGTHENING       Patient with increase BUE to 4+/5 strength. (Progressing)       Start:  12/21/23    Expected End:  01/11/24            Pt will demonstrate I with energy conservation techniques to increase functional activity  tolerance to x15+ min without rest breaks/while maintaining O2 sats.  (Progressing)       Start:  12/21/23    Expected End:  01/11/24               TRANSFERS       Patient will complete functional transfer to toilet with front wheeled walker with Min A level of assistance.       Start:  12/21/23    Expected End:  01/11/24                    <<-----Click here for Discharge Medication Review

## 2024-02-26 ENCOUNTER — APPOINTMENT (OUTPATIENT)
Dept: OBGYN | Facility: CLINIC | Age: 24
End: 2024-02-26
Payer: MEDICAID

## 2024-02-26 VITALS
DIASTOLIC BLOOD PRESSURE: 70 MMHG | WEIGHT: 174 LBS | BODY MASS INDEX: 32.85 KG/M2 | HEIGHT: 61 IN | SYSTOLIC BLOOD PRESSURE: 120 MMHG

## 2024-02-26 PROCEDURE — 99213 OFFICE O/P EST LOW 20 MIN: CPT | Mod: TH,25

## 2024-02-26 PROCEDURE — 81003 URINALYSIS AUTO W/O SCOPE: CPT | Mod: QW

## 2024-02-27 LAB
BASOPHILS # BLD AUTO: 0.05 K/UL
BASOPHILS NFR BLD AUTO: 0.5 %
EOSINOPHIL # BLD AUTO: 0.16 K/UL
EOSINOPHIL NFR BLD AUTO: 1.7 %
FOLATE SERPL-MCNC: >20 NG/ML
GLUCOSE 1H P 50 G GLC PO SERPL-MCNC: 78 MG/DL
HCT VFR BLD CALC: 37.7 %
HGB BLD-MCNC: 12.5 G/DL
IMM GRANULOCYTES NFR BLD AUTO: 0.3 %
LYMPHOCYTES # BLD AUTO: 1.67 K/UL
LYMPHOCYTES NFR BLD AUTO: 17.9 %
MAN DIFF?: NORMAL
MCHC RBC-ENTMCNC: 28.6 PG
MCHC RBC-ENTMCNC: 33.2 GM/DL
MCV RBC AUTO: 86.3 FL
MONOCYTES # BLD AUTO: 0.65 K/UL
MONOCYTES NFR BLD AUTO: 7 %
NEUTROPHILS # BLD AUTO: 6.77 K/UL
NEUTROPHILS NFR BLD AUTO: 72.6 %
PLATELET # BLD AUTO: 237 K/UL
RBC # BLD: 4.37 M/UL
RBC # FLD: 14.2 %
T4 FREE SERPL-MCNC: 0.9 NG/DL
TSH SERPL-ACNC: 0.98 UIU/ML
VIT B12 SERPL-MCNC: 260 PG/ML
WBC # FLD AUTO: 9.33 K/UL

## 2024-04-05 ENCOUNTER — RESULT CHARGE (OUTPATIENT)
Age: 24
End: 2024-04-05

## 2024-04-05 ENCOUNTER — APPOINTMENT (OUTPATIENT)
Dept: OBGYN | Facility: CLINIC | Age: 24
End: 2024-04-05
Payer: MEDICAID

## 2024-04-05 VITALS — WEIGHT: 173 LBS | SYSTOLIC BLOOD PRESSURE: 113 MMHG | BODY MASS INDEX: 32.69 KG/M2 | DIASTOLIC BLOOD PRESSURE: 75 MMHG

## 2024-04-05 LAB
BILIRUB UR QL STRIP: NORMAL
GLUCOSE UR-MCNC: NORMAL
HCG UR QL: 0.2 EU/DL
HGB UR QL STRIP.AUTO: NORMAL
KETONES UR-MCNC: NORMAL
LEUKOCYTE ESTERASE UR QL STRIP: NORMAL
NITRITE UR QL STRIP: NORMAL
PH UR STRIP: 6.5
PROT UR STRIP-MCNC: NORMAL
SP GR UR STRIP: 1.02

## 2024-04-05 PROCEDURE — 81003 URINALYSIS AUTO W/O SCOPE: CPT | Mod: QW

## 2024-04-05 PROCEDURE — 99213 OFFICE O/P EST LOW 20 MIN: CPT | Mod: TH,25

## 2024-04-07 LAB
BASOPHILS # BLD AUTO: 0.03 K/UL
BASOPHILS NFR BLD AUTO: 0.3 %
EOSINOPHIL # BLD AUTO: 0.04 K/UL
EOSINOPHIL NFR BLD AUTO: 0.4 %
HCT VFR BLD CALC: 36.7 %
HGB BLD-MCNC: 12.5 G/DL
HIV1+2 AB SPEC QL IA.RAPID: NONREACTIVE
IMM GRANULOCYTES NFR BLD AUTO: 0.3 %
LYMPHOCYTES # BLD AUTO: 1.48 K/UL
LYMPHOCYTES NFR BLD AUTO: 15.2 %
MAN DIFF?: NORMAL
MCHC RBC-ENTMCNC: 27.5 PG
MCHC RBC-ENTMCNC: 34.1 GM/DL
MCV RBC AUTO: 80.7 FL
MONOCYTES # BLD AUTO: 0.58 K/UL
MONOCYTES NFR BLD AUTO: 5.9 %
NEUTROPHILS # BLD AUTO: 7.59 K/UL
NEUTROPHILS NFR BLD AUTO: 77.9 %
PLATELET # BLD AUTO: 290 K/UL
RBC # BLD: 4.55 M/UL
RBC # FLD: 14.1 %
T PALLIDUM AB SER QL IA: NEGATIVE
WBC # FLD AUTO: 9.75 K/UL

## 2024-04-09 LAB — B-HEM STREP SPEC QL CULT: NORMAL

## 2024-04-15 ENCOUNTER — APPOINTMENT (OUTPATIENT)
Dept: OBGYN | Facility: CLINIC | Age: 24
End: 2024-04-15
Payer: MEDICAID

## 2024-04-15 VITALS — WEIGHT: 175 LBS | BODY MASS INDEX: 33.07 KG/M2 | SYSTOLIC BLOOD PRESSURE: 107 MMHG | DIASTOLIC BLOOD PRESSURE: 74 MMHG

## 2024-04-15 PROCEDURE — 76816 OB US FOLLOW-UP PER FETUS: CPT

## 2024-04-15 PROCEDURE — 81003 URINALYSIS AUTO W/O SCOPE: CPT | Mod: QW

## 2024-04-15 PROCEDURE — 99213 OFFICE O/P EST LOW 20 MIN: CPT | Mod: TH,25

## 2024-04-26 ENCOUNTER — NON-APPOINTMENT (OUTPATIENT)
Age: 24
End: 2024-04-26

## 2024-04-26 ENCOUNTER — APPOINTMENT (OUTPATIENT)
Dept: OBGYN | Facility: CLINIC | Age: 24
End: 2024-04-26
Payer: MEDICAID

## 2024-04-26 VITALS
WEIGHT: 177 LBS | DIASTOLIC BLOOD PRESSURE: 83 MMHG | HEIGHT: 61 IN | BODY MASS INDEX: 33.42 KG/M2 | SYSTOLIC BLOOD PRESSURE: 117 MMHG

## 2024-04-26 LAB
BILIRUB UR QL STRIP: NORMAL
GLUCOSE UR-MCNC: NORMAL
HCG UR QL: 0.2 EU/DL
HCG UR QL: POSITIVE
HGB UR QL STRIP.AUTO: NORMAL
KETONES UR-MCNC: NORMAL
LEUKOCYTE ESTERASE UR QL STRIP: NORMAL
NITRITE UR QL STRIP: NORMAL
PH UR STRIP: 6.5
PROT UR STRIP-MCNC: NORMAL
SP GR UR STRIP: 1.01

## 2024-04-26 PROCEDURE — 81003 URINALYSIS AUTO W/O SCOPE: CPT | Mod: QW

## 2024-04-26 PROCEDURE — 81025 URINE PREGNANCY TEST: CPT

## 2024-04-26 PROCEDURE — 99213 OFFICE O/P EST LOW 20 MIN: CPT | Mod: TH,25

## 2024-05-01 ENCOUNTER — APPOINTMENT (OUTPATIENT)
Dept: OBGYN | Facility: CLINIC | Age: 24
End: 2024-05-01
Payer: MEDICAID

## 2024-05-01 DIAGNOSIS — Z34.90 ENCOUNTER FOR SUPERVISION OF NORMAL PREGNANCY, UNSPECIFIED, UNSPECIFIED TRIMESTER: ICD-10-CM

## 2024-05-01 LAB
BILIRUB UR QL STRIP: NORMAL
GLUCOSE UR-MCNC: NORMAL
HCG UR QL: 0.2 EU/DL
HGB UR QL STRIP.AUTO: NORMAL
KETONES UR-MCNC: NORMAL
LEUKOCYTE ESTERASE UR QL STRIP: NORMAL
NITRITE UR QL STRIP: NORMAL
PH UR STRIP: 6.5
PROT UR STRIP-MCNC: NORMAL
SP GR UR STRIP: >=1.03

## 2024-05-01 PROCEDURE — 81003 URINALYSIS AUTO W/O SCOPE: CPT | Mod: QW

## 2024-05-01 PROCEDURE — 76818 FETAL BIOPHYS PROFILE W/NST: CPT

## 2024-05-01 PROCEDURE — 99213 OFFICE O/P EST LOW 20 MIN: CPT | Mod: TH,25

## 2024-05-02 ENCOUNTER — NON-APPOINTMENT (OUTPATIENT)
Age: 24
End: 2024-05-02

## 2024-05-02 ENCOUNTER — INPATIENT (INPATIENT)
Facility: HOSPITAL | Age: 24
LOS: 2 days | Discharge: HOME | DRG: 560 | End: 2024-05-05
Attending: OBSTETRICS & GYNECOLOGY | Admitting: OBSTETRICS & GYNECOLOGY
Payer: MEDICAID

## 2024-05-02 VITALS — DIASTOLIC BLOOD PRESSURE: 67 MMHG | HEART RATE: 88 BPM | SYSTOLIC BLOOD PRESSURE: 127 MMHG

## 2024-05-02 DIAGNOSIS — O26.893 OTHER SPECIFIED PREGNANCY RELATED CONDITIONS, THIRD TRIMESTER: ICD-10-CM

## 2024-05-02 DIAGNOSIS — O26.899 OTHER SPECIFIED PREGNANCY RELATED CONDITIONS, UNSPECIFIED TRIMESTER: ICD-10-CM

## 2024-05-02 LAB
AMPHET UR-MCNC: NEGATIVE — SIGNIFICANT CHANGE UP
APPEARANCE UR: ABNORMAL
BACTERIA # UR AUTO: ABNORMAL /HPF
BARBITURATES UR SCN-MCNC: NEGATIVE — SIGNIFICANT CHANGE UP
BASOPHILS # BLD AUTO: 0.04 K/UL — SIGNIFICANT CHANGE UP (ref 0–0.2)
BASOPHILS NFR BLD AUTO: 0.4 % — SIGNIFICANT CHANGE UP (ref 0–1)
BENZODIAZ UR-MCNC: NEGATIVE — SIGNIFICANT CHANGE UP
BILIRUB UR-MCNC: NEGATIVE — SIGNIFICANT CHANGE UP
BLD GP AB SCN SERPL QL: SIGNIFICANT CHANGE UP
BUPRENORPHINE SCREEN, URINE RESULT: NEGATIVE — SIGNIFICANT CHANGE UP
CAST: 5 /LPF — HIGH (ref 0–4)
COCAINE METAB.OTHER UR-MCNC: NEGATIVE — SIGNIFICANT CHANGE UP
COLOR SPEC: YELLOW — SIGNIFICANT CHANGE UP
DIFF PNL FLD: ABNORMAL
EOSINOPHIL # BLD AUTO: 0.04 K/UL — SIGNIFICANT CHANGE UP (ref 0–0.7)
EOSINOPHIL NFR BLD AUTO: 0.4 % — SIGNIFICANT CHANGE UP (ref 0–8)
FENTANYL UR QL: NEGATIVE — SIGNIFICANT CHANGE UP
GLUCOSE UR QL: NEGATIVE MG/DL — SIGNIFICANT CHANGE UP
HCT VFR BLD CALC: 39.6 % — SIGNIFICANT CHANGE UP (ref 37–47)
HGB BLD-MCNC: 13.4 G/DL — SIGNIFICANT CHANGE UP (ref 12–16)
IMM GRANULOCYTES NFR BLD AUTO: 0.4 % — HIGH (ref 0.1–0.3)
KETONES UR-MCNC: NEGATIVE MG/DL — SIGNIFICANT CHANGE UP
L&D DRUG SCREEN, URINE: SIGNIFICANT CHANGE UP
LEUKOCYTE ESTERASE UR-ACNC: ABNORMAL
LYMPHOCYTES # BLD AUTO: 1.6 K/UL — SIGNIFICANT CHANGE UP (ref 1.2–3.4)
LYMPHOCYTES # BLD AUTO: 15.1 % — LOW (ref 20.5–51.1)
MCHC RBC-ENTMCNC: 27.7 PG — SIGNIFICANT CHANGE UP (ref 27–31)
MCHC RBC-ENTMCNC: 33.8 G/DL — SIGNIFICANT CHANGE UP (ref 32–37)
MCV RBC AUTO: 81.8 FL — SIGNIFICANT CHANGE UP (ref 81–99)
METHADONE UR-MCNC: NEGATIVE — SIGNIFICANT CHANGE UP
MONOCYTES # BLD AUTO: 0.77 K/UL — HIGH (ref 0.1–0.6)
MONOCYTES NFR BLD AUTO: 7.3 % — SIGNIFICANT CHANGE UP (ref 1.7–9.3)
NEUTROPHILS # BLD AUTO: 8.13 K/UL — HIGH (ref 1.4–6.5)
NEUTROPHILS NFR BLD AUTO: 76.4 % — HIGH (ref 42.2–75.2)
NITRITE UR-MCNC: NEGATIVE — SIGNIFICANT CHANGE UP
NRBC # BLD: 0 /100 WBCS — SIGNIFICANT CHANGE UP (ref 0–0)
OPIATES UR-MCNC: NEGATIVE — SIGNIFICANT CHANGE UP
OXYCODONE UR-MCNC: NEGATIVE — SIGNIFICANT CHANGE UP
PCP UR-MCNC: NEGATIVE — SIGNIFICANT CHANGE UP
PH UR: 7 — SIGNIFICANT CHANGE UP (ref 5–8)
PLATELET # BLD AUTO: 238 K/UL — SIGNIFICANT CHANGE UP (ref 130–400)
PMV BLD: 10.8 FL — HIGH (ref 7.4–10.4)
PRENATAL SYPHILIS TEST: SIGNIFICANT CHANGE UP
PROPOXYPHENE QUALITATIVE URINE RESULT: NEGATIVE — SIGNIFICANT CHANGE UP
PROT UR-MCNC: NEGATIVE MG/DL — SIGNIFICANT CHANGE UP
RBC # BLD: 4.84 M/UL — SIGNIFICANT CHANGE UP (ref 4.2–5.4)
RBC # FLD: 14.9 % — HIGH (ref 11.5–14.5)
RBC CASTS # UR COMP ASSIST: 1 /HPF — SIGNIFICANT CHANGE UP (ref 0–4)
SP GR SPEC: 1.02 — SIGNIFICANT CHANGE UP (ref 1–1.03)
SQUAMOUS # UR AUTO: 17 /HPF — HIGH (ref 0–5)
UROBILINOGEN FLD QL: 0.2 MG/DL — SIGNIFICANT CHANGE UP (ref 0.2–1)
WBC # BLD: 10.62 K/UL — SIGNIFICANT CHANGE UP (ref 4.8–10.8)
WBC # FLD AUTO: 10.62 K/UL — SIGNIFICANT CHANGE UP (ref 4.8–10.8)
WBC UR QL: 7 /HPF — HIGH (ref 0–5)
YEAST-LIKE CELLS: PRESENT

## 2024-05-02 PROCEDURE — 86850 RBC ANTIBODY SCREEN: CPT

## 2024-05-02 PROCEDURE — 86900 BLOOD TYPING SEROLOGIC ABO: CPT

## 2024-05-02 PROCEDURE — 80354 DRUG SCREENING FENTANYL: CPT

## 2024-05-02 PROCEDURE — 86901 BLOOD TYPING SEROLOGIC RH(D): CPT

## 2024-05-02 PROCEDURE — 85025 COMPLETE CBC W/AUTO DIFF WBC: CPT

## 2024-05-02 PROCEDURE — 36415 COLL VENOUS BLD VENIPUNCTURE: CPT

## 2024-05-02 PROCEDURE — 81001 URINALYSIS AUTO W/SCOPE: CPT

## 2024-05-02 PROCEDURE — 59050 FETAL MONITOR W/REPORT: CPT

## 2024-05-02 PROCEDURE — 86592 SYPHILIS TEST NON-TREP QUAL: CPT

## 2024-05-02 PROCEDURE — 59409 OBSTETRICAL CARE: CPT | Mod: U9

## 2024-05-02 PROCEDURE — 80307 DRUG TEST PRSMV CHEM ANLYZR: CPT

## 2024-05-02 RX ORDER — ACETAMINOPHEN 500 MG
975 TABLET ORAL
Refills: 0 | Status: DISCONTINUED | OUTPATIENT
Start: 2024-05-02 | End: 2024-05-05

## 2024-05-02 RX ORDER — OXYCODONE HYDROCHLORIDE 5 MG/1
5 TABLET ORAL ONCE
Refills: 0 | Status: DISCONTINUED | OUTPATIENT
Start: 2024-05-02 | End: 2024-05-05

## 2024-05-02 RX ORDER — TETANUS TOXOID, REDUCED DIPHTHERIA TOXOID AND ACELLULAR PERTUSSIS VACCINE, ADSORBED 5; 2.5; 8; 8; 2.5 [IU]/.5ML; [IU]/.5ML; UG/.5ML; UG/.5ML; UG/.5ML
0.5 SUSPENSION INTRAMUSCULAR ONCE
Refills: 0 | Status: DISCONTINUED | OUTPATIENT
Start: 2024-05-02 | End: 2024-05-05

## 2024-05-02 RX ORDER — MAGNESIUM HYDROXIDE 400 MG/1
30 TABLET, CHEWABLE ORAL
Refills: 0 | Status: DISCONTINUED | OUTPATIENT
Start: 2024-05-02 | End: 2024-05-05

## 2024-05-02 RX ORDER — IBUPROFEN 200 MG
600 TABLET ORAL EVERY 6 HOURS
Refills: 0 | Status: DISCONTINUED | OUTPATIENT
Start: 2024-05-02 | End: 2024-05-05

## 2024-05-02 RX ORDER — DIPHENHYDRAMINE HCL 50 MG
25 CAPSULE ORAL EVERY 6 HOURS
Refills: 0 | Status: DISCONTINUED | OUTPATIENT
Start: 2024-05-02 | End: 2024-05-05

## 2024-05-02 RX ORDER — CHLORHEXIDINE GLUCONATE 213 G/1000ML
1 SOLUTION TOPICAL DAILY
Refills: 0 | Status: DISCONTINUED | OUTPATIENT
Start: 2024-05-02 | End: 2024-05-02

## 2024-05-02 RX ORDER — DIBUCAINE 1 %
1 OINTMENT (GRAM) RECTAL EVERY 6 HOURS
Refills: 0 | Status: DISCONTINUED | OUTPATIENT
Start: 2024-05-02 | End: 2024-05-05

## 2024-05-02 RX ORDER — SODIUM CHLORIDE 9 MG/ML
3 INJECTION INTRAMUSCULAR; INTRAVENOUS; SUBCUTANEOUS EVERY 8 HOURS
Refills: 0 | Status: DISCONTINUED | OUTPATIENT
Start: 2024-05-02 | End: 2024-05-05

## 2024-05-02 RX ORDER — OXYTOCIN 10 UNIT/ML
333.33 VIAL (ML) INJECTION
Qty: 20 | Refills: 0 | Status: DISCONTINUED | OUTPATIENT
Start: 2024-05-02 | End: 2024-05-02

## 2024-05-02 RX ORDER — OXYTOCIN 10 UNIT/ML
41.67 VIAL (ML) INJECTION
Qty: 20 | Refills: 0 | Status: DISCONTINUED | OUTPATIENT
Start: 2024-05-02 | End: 2024-05-05

## 2024-05-02 RX ORDER — HYDROCORTISONE 1 %
1 OINTMENT (GRAM) TOPICAL EVERY 6 HOURS
Refills: 0 | Status: DISCONTINUED | OUTPATIENT
Start: 2024-05-02 | End: 2024-05-05

## 2024-05-02 RX ORDER — KETOROLAC TROMETHAMINE 30 MG/ML
30 SYRINGE (ML) INJECTION ONCE
Refills: 0 | Status: DISCONTINUED | OUTPATIENT
Start: 2024-05-02 | End: 2024-05-02

## 2024-05-02 RX ORDER — OXYCODONE HYDROCHLORIDE 5 MG/1
5 TABLET ORAL
Refills: 0 | Status: DISCONTINUED | OUTPATIENT
Start: 2024-05-02 | End: 2024-05-05

## 2024-05-02 RX ORDER — INFLUENZA VIRUS VACCINE 15; 15; 15; 15 UG/.5ML; UG/.5ML; UG/.5ML; UG/.5ML
0.5 SUSPENSION INTRAMUSCULAR ONCE
Refills: 0 | Status: DISCONTINUED | OUTPATIENT
Start: 2024-05-02 | End: 2024-05-02

## 2024-05-02 RX ORDER — CITRIC ACID/SODIUM CITRATE 300-500 MG
15 SOLUTION, ORAL ORAL EVERY 6 HOURS
Refills: 0 | Status: DISCONTINUED | OUTPATIENT
Start: 2024-05-02 | End: 2024-05-02

## 2024-05-02 RX ORDER — BENZOCAINE 10 %
1 GEL (GRAM) MUCOUS MEMBRANE EVERY 6 HOURS
Refills: 0 | Status: DISCONTINUED | OUTPATIENT
Start: 2024-05-02 | End: 2024-05-05

## 2024-05-02 RX ORDER — PRAMOXINE HYDROCHLORIDE 150 MG/15G
1 AEROSOL, FOAM RECTAL EVERY 4 HOURS
Refills: 0 | Status: DISCONTINUED | OUTPATIENT
Start: 2024-05-02 | End: 2024-05-05

## 2024-05-02 RX ORDER — AER TRAVELER 0.5 G/1
1 SOLUTION RECTAL; TOPICAL EVERY 4 HOURS
Refills: 0 | Status: DISCONTINUED | OUTPATIENT
Start: 2024-05-02 | End: 2024-05-05

## 2024-05-02 RX ORDER — IBUPROFEN 200 MG
600 TABLET ORAL EVERY 6 HOURS
Refills: 0 | Status: COMPLETED | OUTPATIENT
Start: 2024-05-02 | End: 2025-03-31

## 2024-05-02 RX ORDER — SIMETHICONE 80 MG/1
80 TABLET, CHEWABLE ORAL EVERY 4 HOURS
Refills: 0 | Status: DISCONTINUED | OUTPATIENT
Start: 2024-05-02 | End: 2024-05-05

## 2024-05-02 RX ORDER — SODIUM CHLORIDE 9 MG/ML
1000 INJECTION, SOLUTION INTRAVENOUS
Refills: 0 | Status: DISCONTINUED | OUTPATIENT
Start: 2024-05-02 | End: 2024-05-02

## 2024-05-02 RX ORDER — LANOLIN
1 OINTMENT (GRAM) TOPICAL EVERY 6 HOURS
Refills: 0 | Status: DISCONTINUED | OUTPATIENT
Start: 2024-05-02 | End: 2024-05-05

## 2024-05-02 RX ADMIN — SODIUM CHLORIDE 3 MILLILITER(S): 9 INJECTION INTRAMUSCULAR; INTRAVENOUS; SUBCUTANEOUS at 23:18

## 2024-05-02 RX ADMIN — Medication 125 MILLIUNIT(S)/MIN: at 21:34

## 2024-05-02 RX ADMIN — Medication 975 MILLIGRAM(S): at 21:33

## 2024-05-02 RX ADMIN — Medication 30 MILLIGRAM(S): at 19:57

## 2024-05-02 NOTE — OB PROVIDER DELIVERY SUMMARY - NSSELHIDDEN_OBGYN_ALL_OB_FT
[NS_DeliveryAttending1_OBGYN_ALL_OB_FT:[email or web address]=],[NS_DeliveryRN_OBGYN_ALL_OB_FT:[id number]=],[NS_CirculateRN2_OBGYN_ALL_OB_FT:[email or web address]=]

## 2024-05-02 NOTE — OB PROVIDER H&P - NSHPPHYSICALEXAM_GEN_ALL_CORE
Vital Signs Last 24 Hrs  T(C): 36.7 (02 May 2024 08:39), Max: 36.7 (02 May 2024 08:39)  T(F): 98 (02 May 2024 08:39), Max: 98 (02 May 2024 08:39)  HR: 88 (02 May 2024 08:39) (88 - 88)  BP: 127/67 (02 May 2024 08:39) (127/67 - 127/67)  RR: 18 (02 May 2024 08:39) (18 - 18)    Gen: AOx3, NAD  EFM: 130/mod/+Accels  [place]: q8m  SVE: 5.5/50/-2, vertex, intact

## 2024-05-02 NOTE — OB PROVIDER H&P - ASSESSMENT
25yo  @40w4d, GBS neg, h/o preeclampsia with severe features in P1, in labor.    -Admit to L&D  -Admission labs  -Monitor vitals  -Cont EFM/Amagon  -Pain management prn  -Clear liquid diet    Dr. Carson aware

## 2024-05-02 NOTE — OB RN DELIVERY SUMMARY - NSSELHIDDEN_OBGYN_ALL_OB_FT
[NS_DeliveryAttending1_OBGYN_ALL_OB_FT:[email or web address]=],[NS_DeliveryRN_OBGYN_ALL_OB_FT:[id number]=],[NS_CirculateRN2_OBGYN_ALL_OB_FT:[email or web address]=]

## 2024-05-02 NOTE — OB PROVIDER TRIAGE NOTE - HISTORY OF PRESENT ILLNESS
23yo  @40w4d, SIENNA: 24 by LMP, presents with contractions since this AM, q7m. Denies vaginal bleeding, leakage of fluid. Good fetal movement. Denies complications in this pregnancy. H/o preeclampsia with severe features in P1, all normal BPs in P2 and P3. GBS neg.

## 2024-05-02 NOTE — OB PROVIDER H&P - NS_GBS_INFANT_INVASIVE_OBGYN_ALL_OB_FT
Cataract symptoms i.e., glare, blur discussed. Pt to call if worsening vision or trouble with driving, TV, reading, ADL. UV precautions. Reviewed possibility of future cataract surgery. Patient states no history

## 2024-05-02 NOTE — OB RN PATIENT PROFILE - NS_FINALEDD_OBGYN_ALL_OB_DT
CST     28-Apr-2024

## 2024-05-02 NOTE — OB PROVIDER DELIVERY SUMMARY - AS DELIV COMPLICATIONS OB
The pt has a history of IBS with constipation and GERD who presents for f/u office visiit. The pt has struggled with heartburn and indigestion and she has done well on Famotidine 40mg po BID. She notes that she continue to struggle with constipaton and she is not currently taking Miralax which has been prescribed in the past. She is due for f/u colonoscopy.
none

## 2024-05-02 NOTE — OB RN DELIVERY SUMMARY - NS_SEPSISRSKCALC_OBGYN_ALL_OB_FT
EOS calculated successfully. EOS Risk Factor: 0.5/1000 live births ([facility] national incidence); GA=40w4d; Temp=98; ROM=0; GBS='Negative'; Antibiotics='No antibiotics or any antibiotics < 2 hrs prior to birth'

## 2024-05-02 NOTE — OB RN PATIENT PROFILE - FALL HARM RISK - PATIENT NEEDS ASSISTANCE
FAMILY HISTORY:  Father  Still living? No  Family history of leukemia, Age at diagnosis: Age Unknown  Family history of myocardial infarction at age less than 60, Age at diagnosis: Age Unknown    Mother  Still living? No  Family history of dementia, Age at diagnosis: Age Unknown  Family history of DVT, Age at diagnosis: Age Unknown    Sibling  Still living? Yes, Estimated age: 71-80  Family history of asthma, Age at diagnosis: Age Unknown    Child  Still living? Yes, Estimated age: 51-60  Family history of factor V deficiency, Age at diagnosis: Age Unknown     No assistance needed

## 2024-05-02 NOTE — OB PROVIDER H&P - HISTORY OF PRESENT ILLNESS
25yo  @40w4d, SIENNA: 24 by LMP, presents with contractions since this AM, q7m. Denies vaginal bleeding, leakage of fluid. Good fetal movement. Denies complications in this pregnancy. H/o preeclampsia with severe features in P1, all normal BPs in P2 and P3. GBS neg.

## 2024-05-02 NOTE — OB PROVIDER TRIAGE NOTE - NSHPPHYSICALEXAM_GEN_ALL_CORE
Vital Signs Last 24 Hrs  T(C): 36.7 (02 May 2024 08:39), Max: 36.7 (02 May 2024 08:39)  T(F): 98 (02 May 2024 08:39), Max: 98 (02 May 2024 08:39)  HR: 88 (02 May 2024 08:39) (88 - 88)  BP: 127/67 (02 May 2024 08:39) (127/67 - 127/67)  RR: 18 (02 May 2024 08:39) (18 - 18)    Gen: AOx3, NAD  EFM: 130/mod/+accels  [place]: irregular  SVE: 4.5/50/-3, vertex, intact  Abd: soft, gravid, nontender  BSS: bPP 8/8, MVP 3.42cm, vertex, anterior placenta

## 2024-05-02 NOTE — OB PROVIDER TRIAGE NOTE - NSOBPROVIDERNOTE_OBGYN_ALL_OB_FT
25yo  @40w4d, GBS neg, in early labor, BPP 10/10, reassuring maternal and fetal status.    -patient desiring to ambulate and return for re-evaluation  -labor precautions given    Dr. Heard at bedside

## 2024-05-02 NOTE — OB PROVIDER H&P - NSHPLABSRESULTS_GEN_ALL_CORE
Type and Screen: B pos  Antibody screen: neg   Rubella: immune  Varicella: immune   RPR: neg  HbSAg: neg  HIV: NR    Second Trimester:  1 hour Glucola: 78    Third Trimester:  HIV: NR  GBS: neg    sonos:  21w4d: 421g, normal anatomy, anterior placenta

## 2024-05-02 NOTE — OB PROVIDER TRIAGE NOTE - NSHPLABSRESULTS_GEN_ALL_CORE
Left message for patient at      Telephone Information:   Mobile [phone]    to schedule procedure.  Patient to return call to [place] [phone]     Type and Screen: B pos  Antibody screen: neg   Rubella: immune  Varicella: immune   RPR: neg  HbSAg: neg  HIV: NR    Second Trimester:  1 hour Glucola: 78    Third Trimester:  HIV: NR  GBS: neg    sonos:  21w4d: 421g, normal anatomy, anterior placenta

## 2024-05-02 NOTE — OB PROVIDER DELIVERY SUMMARY - NSPROVIDERDELIVERYNOTE_OBGYN_ALL_OB_FT
Patient was fully dilated and pushing. Fetal head was OA and restituted to ROT. The anterior and posterior shoulders delivered, followed by the remaining body atraumatically. The  was handed to the mother and RN.  Delayed cord clamping was performed, and then clamped and cut. Cord blood gases collected x2. The placenta delivered intact with membranes. Pitocin was administered. Uterus massaged, fundus found to be firm. Cervix, vagina and perineum inspected a first degree laceration was noted, repaired using 2-0 chromic in the usual fashion with good hemostasis.     Viable female infant delivered, weighing 3130 gram, with APGARs 9/9    Laceration: first degree      Dr. Carson present for the delivery

## 2024-05-03 ENCOUNTER — APPOINTMENT (OUTPATIENT)
Dept: OBGYN | Facility: CLINIC | Age: 24
End: 2024-05-03

## 2024-05-03 ENCOUNTER — TRANSCRIPTION ENCOUNTER (OUTPATIENT)
Age: 24
End: 2024-05-03

## 2024-05-03 LAB
BASOPHILS # BLD AUTO: 0.04 K/UL — SIGNIFICANT CHANGE UP (ref 0–0.2)
BASOPHILS NFR BLD AUTO: 0.4 % — SIGNIFICANT CHANGE UP (ref 0–1)
EOSINOPHIL # BLD AUTO: 0.09 K/UL — SIGNIFICANT CHANGE UP (ref 0–0.7)
EOSINOPHIL NFR BLD AUTO: 0.9 % — SIGNIFICANT CHANGE UP (ref 0–8)
HCT VFR BLD CALC: 37.3 % — SIGNIFICANT CHANGE UP (ref 37–47)
HGB BLD-MCNC: 12.7 G/DL — SIGNIFICANT CHANGE UP (ref 12–16)
IMM GRANULOCYTES NFR BLD AUTO: 0.4 % — HIGH (ref 0.1–0.3)
LYMPHOCYTES # BLD AUTO: 2.09 K/UL — SIGNIFICANT CHANGE UP (ref 1.2–3.4)
LYMPHOCYTES # BLD AUTO: 20.4 % — LOW (ref 20.5–51.1)
MCHC RBC-ENTMCNC: 27.9 PG — SIGNIFICANT CHANGE UP (ref 27–31)
MCHC RBC-ENTMCNC: 34 G/DL — SIGNIFICANT CHANGE UP (ref 32–37)
MCV RBC AUTO: 81.8 FL — SIGNIFICANT CHANGE UP (ref 81–99)
MONOCYTES # BLD AUTO: 0.91 K/UL — HIGH (ref 0.1–0.6)
MONOCYTES NFR BLD AUTO: 8.9 % — SIGNIFICANT CHANGE UP (ref 1.7–9.3)
NEUTROPHILS # BLD AUTO: 7.1 K/UL — HIGH (ref 1.4–6.5)
NEUTROPHILS NFR BLD AUTO: 69 % — SIGNIFICANT CHANGE UP (ref 42.2–75.2)
NRBC # BLD: 0 /100 WBCS — SIGNIFICANT CHANGE UP (ref 0–0)
PLATELET # BLD AUTO: 212 K/UL — SIGNIFICANT CHANGE UP (ref 130–400)
PMV BLD: 11.4 FL — HIGH (ref 7.4–10.4)
RBC # BLD: 4.56 M/UL — SIGNIFICANT CHANGE UP (ref 4.2–5.4)
RBC # FLD: 15 % — HIGH (ref 11.5–14.5)
WBC # BLD: 10.27 K/UL — SIGNIFICANT CHANGE UP (ref 4.8–10.8)
WBC # FLD AUTO: 10.27 K/UL — SIGNIFICANT CHANGE UP (ref 4.8–10.8)

## 2024-05-03 RX ADMIN — SODIUM CHLORIDE 3 MILLILITER(S): 9 INJECTION INTRAMUSCULAR; INTRAVENOUS; SUBCUTANEOUS at 21:51

## 2024-05-03 RX ADMIN — Medication 600 MILLIGRAM(S): at 23:05

## 2024-05-03 RX ADMIN — Medication 600 MILLIGRAM(S): at 12:41

## 2024-05-03 RX ADMIN — Medication 600 MILLIGRAM(S): at 03:10

## 2024-05-03 RX ADMIN — Medication 600 MILLIGRAM(S): at 23:35

## 2024-05-03 RX ADMIN — Medication 600 MILLIGRAM(S): at 03:40

## 2024-05-03 RX ADMIN — SODIUM CHLORIDE 3 MILLILITER(S): 9 INJECTION INTRAMUSCULAR; INTRAVENOUS; SUBCUTANEOUS at 19:55

## 2024-05-03 RX ADMIN — SODIUM CHLORIDE 3 MILLILITER(S): 9 INJECTION INTRAMUSCULAR; INTRAVENOUS; SUBCUTANEOUS at 06:07

## 2024-05-04 VITALS
TEMPERATURE: 98 F | RESPIRATION RATE: 18 BRPM | DIASTOLIC BLOOD PRESSURE: 58 MMHG | HEART RATE: 72 BPM | SYSTOLIC BLOOD PRESSURE: 115 MMHG

## 2024-05-04 PROCEDURE — 99231 SBSQ HOSP IP/OBS SF/LOW 25: CPT

## 2024-05-04 RX ORDER — BENZOCAINE 10 %
1 GEL (GRAM) MUCOUS MEMBRANE
Qty: 0 | Refills: 0 | DISCHARGE
Start: 2024-05-04

## 2024-05-04 RX ORDER — AER TRAVELER 0.5 G/1
1 SOLUTION RECTAL; TOPICAL
Qty: 0 | Refills: 0 | DISCHARGE
Start: 2024-05-04

## 2024-05-04 RX ADMIN — SODIUM CHLORIDE 3 MILLILITER(S): 9 INJECTION INTRAMUSCULAR; INTRAVENOUS; SUBCUTANEOUS at 06:59

## 2024-05-04 RX ADMIN — SODIUM CHLORIDE 3 MILLILITER(S): 9 INJECTION INTRAMUSCULAR; INTRAVENOUS; SUBCUTANEOUS at 14:40

## 2024-05-04 RX ADMIN — Medication 600 MILLIGRAM(S): at 09:48

## 2024-05-04 RX ADMIN — Medication 600 MILLIGRAM(S): at 08:15

## 2024-05-04 RX ADMIN — Medication 600 MILLIGRAM(S): at 17:07

## 2024-05-04 RX ADMIN — Medication 600 MILLIGRAM(S): at 14:35

## 2024-05-04 NOTE — DISCHARGE NOTE OB - CARE PROVIDER_API CALL
Carlos Eduardo Heard  Obstetrics and Gynecology  [address] [place] [address]  Phone: [phone]  Fax: [phone]  Follow Up Time:

## 2024-05-04 NOTE — PROGRESS NOTE ADULT - SUBJECTIVE AND OBJECTIVE BOX
Patient seen resting comfortably. No acute events overnight, no current complaints. Pain controlled with motrin. She reports lochia as minimal, no fevers, chills, nausea, vomiting, SOB, palpitations, dizziness. Patient is ambulating, tolerating diet, voiding freely without issue. Breastfeeding without difficulty.    Exam:  Gen: AAOx3  Breast: no engorgement, erythema, or tenderness  Abd: soft, non-tender, non-distended, uterus firm and to umbilicus  Ext: non-tender LE bilaterally    A: s/p NSD PPD#2, stable    Plan  - continue routine pp care  - encourage ambulating and breastfeeding  - motrin for pain  - discharge with routine f/u  
    Pt doing well, pain well controlled. Denies heavy vaginal bleeding. No overnight events, no acute complaints. Ambulating without difficulty, voiding, and tolerating regular diet. Breastfeeding.    PAST MEDICAL & SURGICAL HISTORY:  No pertinent past medical history      No significant past surgical history          Physical Exam  Vital Signs Last 24 Hrs  T(F): 97.8 (03 May 2024 08:22), Max: 98.1 (02 May 2024 19:29)  HR: 60 (03 May 2024 08:22) (60 - 92)  BP: 98/55 (03 May 2024 08:22) (93/57 - 127/67)  RR: 18 (03 May 2024 08:22) (16 - 18)    Gen: AAOx3, NAD  Abd: Soft, nontender, nondistended  Fundus: Firm, nontender, below the umbilicus  Lochia: Minimal  Ext: No calf tenderness, no swelling    Labs:                        13.4   10.62 )-----------( 238      ( 02 May 2024 15:27 )             39.6

## 2024-05-04 NOTE — DISCHARGE NOTE OB - MATERIALS PROVIDED
Guide to Postpartum Care/[facility] Hearing Screen Program/Shaken Baby Prevention Handout/Breastfeeding Guide and Packet/Birth Certificate Instructions

## 2024-05-04 NOTE — DISCHARGE NOTE OB - NS OB DC IMMUNIZATIONS MMR YN
The medication list included in this document is our record of what you are currently taking, including any changes that were made at today's visit.  If you find any differences when compared to your medications at home, or have any questions that were not answered at your visit, please contact the office.    After the recommended changes have been made in blood pressure medicines, patient advised to keep BP/HR(pulse rate) chart twice daily and bring us results in next 4 to 5 days. Patient may send the results via \"My Chart\" if desired.  Please rest for 5-10 minutes before checking blood pressure.  Sit on a comfortable chair without crossing the legs and put your arm on a table.  We recommend that you use an upper arm cuff.  Check the blood pressure 3 times each time you check the blood pressure and record the lowest reading.  If you check the blood pressure in both arms, use the higher reading.    No

## 2024-05-04 NOTE — DISCHARGE NOTE OB - PATIENT PORTAL LINK FT
You can access the FollowMyHealth Patient Portal offered by [facility] by registering at the following website: [email or web address]. By joining [employer]’s FollowMyHealth portal, you will also be able to view your health information using other applications (apps) compatible with our system.

## 2024-05-04 NOTE — DISCHARGE NOTE OB - NS MD DC FALL RISK RISK
For information on Fall & Injury Prevention, visit: [email or web address] OR  [email or web address] OR  https://www.cdc.gov/steadi/patient.html

## 2024-05-04 NOTE — DISCHARGE NOTE OB - MEDICATION SUMMARY - MEDICATIONS TO TAKE
I will START or STAY ON the medications listed below when I get home from the hospital:    ibuprofen 600 mg oral tablet  -- 1 tab(s) by mouth every 6 hours, As Needed  -- Indication: For pain    acetaminophen 325 mg oral tablet  -- 3 tab(s) by mouth every 6 hours, As Needed  -- Indication: For pain    benzocaine 20% topical spray  -- 1 Apply on skin to affected area every 6 hours As needed for Perineal discomfort  -- Indication: For perineal discomfort    witch hazel 50% topical pad  -- 1 Apply on skin to affected area every 4 hours As needed Perineal discomfort  -- Indication: For perineal discomfort    Prenatal Multivitamins with Folic Acid 1 mg oral tablet  -- 1 tab(s) by mouth once a day  -- Indication: For healthy mother

## 2024-05-10 DIAGNOSIS — Z3A.40 40 WEEKS GESTATION OF PREGNANCY: ICD-10-CM

## 2024-05-10 DIAGNOSIS — Z91.011 ALLERGY TO MILK PRODUCTS: ICD-10-CM

## 2024-05-10 DIAGNOSIS — O48.0 POST-TERM PREGNANCY: ICD-10-CM

## 2024-06-13 NOTE — DISCHARGE NOTE OB - CRACKED, BLEEDING NIPPLES
Pt received blood transfusion in hospital per PCP recommendation on 6/12/2024 follow up scheduled for 6/20/2024 @ 10am.   Statement Selected

## 2024-06-27 ENCOUNTER — APPOINTMENT (OUTPATIENT)
Dept: OBGYN | Facility: CLINIC | Age: 24
End: 2024-06-27
Payer: MEDICAID

## 2024-06-27 VITALS — DIASTOLIC BLOOD PRESSURE: 76 MMHG | WEIGHT: 170 LBS | SYSTOLIC BLOOD PRESSURE: 116 MMHG | BODY MASS INDEX: 32.12 KG/M2

## 2024-07-26 DIAGNOSIS — Z78.9 OTHER SPECIFIED HEALTH STATUS: ICD-10-CM

## 2024-07-26 RX ORDER — NORETHINDRONE 0.35 MG/1
0.35 TABLET ORAL
Qty: 1 | Refills: 5 | Status: ACTIVE | COMMUNITY
Start: 2024-07-26 | End: 1900-01-01

## 2024-09-20 NOTE — PROGRESS NOTE ADULT - SUBJECTIVE AND OBJECTIVE BOX
- patient with history of hyperparathyroidism  - patient also has history of hypercalcemia, due to hydrochlorothiazide use, this was stopped 07/2024  - since cessation of hydrochlorothiazide, calcium now within normal range   - avoid the use of hydrochlorothiazide/Chlorthalidone as this can lead to hypercalcemia  - will continue monitoring situation   OB attending  PPD #1    Pt doing well, pain well controlled. No overnight events, no acute complaints.    Ambulating: Yes  Voiding: Yes  Flatus: Yes  Bowel movements: Yes   Breast or bottle feeding: Breastfeeding  Diet: Regular    PAST MEDICAL & SURGICAL HISTORY:  No pertinent past medical history    No significant past surgical history        Physical Exam  Vital Signs Last 24 Hrs  T(C): 36.3 (10 Mar 2022 23:15), Max: 36.6 (10 Mar 2022 15:06)  T(F): 97.4 (10 Mar 2022 23:15), Max: 97.8 (10 Mar 2022 15:06)  HR: 85 (10 Mar 2022 23:15) (72 - 87)  BP: 111/67 (10 Mar 2022 23:15) (105/56 - 111/67)  BP(mean): --  RR: 18 (10 Mar 2022 23:15) (18 - 18)  SpO2: --  Gen: AAOx3, NAD  Abd: Soft, nontender, nondistended, BS+  Fundus: Firm, below umbilicus  Lochia: normal  Ext: No calf tenderness, no swelling    Labs:                        13.4   11.89 )-----------( 211      ( 10 Mar 2022 16:00 )             39.7         A/P: s/p , PPD #1, doing well  - continue current management  anticipate d/c home

## 2025-01-10 RX ORDER — NORETHINDRONE 0.35 MG/1
0.35 TABLET ORAL
Qty: 1 | Refills: 5 | Status: ACTIVE | COMMUNITY
Start: 2025-01-10 | End: 1900-01-01

## 2025-05-15 ENCOUNTER — NON-APPOINTMENT (OUTPATIENT)
Age: 25
End: 2025-05-15